# Patient Record
Sex: FEMALE | Race: WHITE | NOT HISPANIC OR LATINO | URBAN - METROPOLITAN AREA
[De-identification: names, ages, dates, MRNs, and addresses within clinical notes are randomized per-mention and may not be internally consistent; named-entity substitution may affect disease eponyms.]

---

## 2017-11-16 ENCOUNTER — OUTPATIENT (OUTPATIENT)
Dept: OUTPATIENT SERVICES | Facility: HOSPITAL | Age: 52
LOS: 1 days | End: 2017-11-16
Payer: COMMERCIAL

## 2017-11-16 VITALS
TEMPERATURE: 98 F | OXYGEN SATURATION: 96 % | RESPIRATION RATE: 18 BRPM | DIASTOLIC BLOOD PRESSURE: 72 MMHG | SYSTOLIC BLOOD PRESSURE: 133 MMHG | HEART RATE: 92 BPM | WEIGHT: 169.09 LBS | HEIGHT: 65.5 IN

## 2017-11-16 DIAGNOSIS — Z90.89 ACQUIRED ABSENCE OF OTHER ORGANS: Chronic | ICD-10-CM

## 2017-11-16 DIAGNOSIS — Z98.82 BREAST IMPLANT STATUS: Chronic | ICD-10-CM

## 2017-11-16 DIAGNOSIS — Z01.810 ENCOUNTER FOR PREPROCEDURAL CARDIOVASCULAR EXAMINATION: ICD-10-CM

## 2017-11-16 LAB
ANION GAP SERPL CALC-SCNC: 15 MMOL/L — SIGNIFICANT CHANGE UP (ref 5–17)
APTT BLD: 29.5 SEC — SIGNIFICANT CHANGE UP (ref 27.5–37.4)
BUN SERPL-MCNC: 12 MG/DL — SIGNIFICANT CHANGE UP (ref 8–20)
CALCIUM SERPL-MCNC: 9.1 MG/DL — SIGNIFICANT CHANGE UP (ref 8.6–10.2)
CHLORIDE SERPL-SCNC: 96 MMOL/L — LOW (ref 98–107)
CHOLEST SERPL-MCNC: 281 MG/DL — HIGH (ref 110–199)
CO2 SERPL-SCNC: 26 MMOL/L — SIGNIFICANT CHANGE UP (ref 22–29)
CREAT SERPL-MCNC: 0.58 MG/DL — SIGNIFICANT CHANGE UP (ref 0.5–1.3)
GLUCOSE SERPL-MCNC: 238 MG/DL — HIGH (ref 70–115)
HCG SERPL-ACNC: <2 MIU/ML — SIGNIFICANT CHANGE UP
HCT VFR BLD CALC: 37.7 % — SIGNIFICANT CHANGE UP (ref 37–47)
HDLC SERPL-MCNC: 37 MG/DL — LOW
HGB BLD-MCNC: 12.2 G/DL — SIGNIFICANT CHANGE UP (ref 12–16)
INR BLD: 1.04 RATIO — SIGNIFICANT CHANGE UP (ref 0.88–1.16)
LIPID PNL WITH DIRECT LDL SERPL: 172 MG/DL — SIGNIFICANT CHANGE UP
MAGNESIUM SERPL-MCNC: 1.8 MG/DL — SIGNIFICANT CHANGE UP (ref 1.8–2.6)
MCHC RBC-ENTMCNC: 27 PG — SIGNIFICANT CHANGE UP (ref 27–31)
MCHC RBC-ENTMCNC: 32.4 G/DL — SIGNIFICANT CHANGE UP (ref 32–36)
MCV RBC AUTO: 83.4 FL — SIGNIFICANT CHANGE UP (ref 81–99)
PLATELET # BLD AUTO: 371 K/UL — SIGNIFICANT CHANGE UP (ref 150–400)
POTASSIUM SERPL-MCNC: 3.9 MMOL/L — SIGNIFICANT CHANGE UP (ref 3.5–5.3)
POTASSIUM SERPL-SCNC: 3.9 MMOL/L — SIGNIFICANT CHANGE UP (ref 3.5–5.3)
PROTHROM AB SERPL-ACNC: 11.5 SEC — SIGNIFICANT CHANGE UP (ref 9.8–12.7)
RBC # BLD: 4.52 M/UL — SIGNIFICANT CHANGE UP (ref 4.4–5.2)
RBC # FLD: 14.3 % — SIGNIFICANT CHANGE UP (ref 11–15.6)
SODIUM SERPL-SCNC: 137 MMOL/L — SIGNIFICANT CHANGE UP (ref 135–145)
TOTAL CHOLESTEROL/HDL RATIO MEASUREMENT: 8 RATIO — HIGH (ref 3.3–7.1)
TRIGL SERPL-MCNC: 359 MG/DL — HIGH (ref 10–200)
WBC # BLD: 10.7 K/UL — SIGNIFICANT CHANGE UP (ref 4.8–10.8)
WBC # FLD AUTO: 10.7 K/UL — SIGNIFICANT CHANGE UP (ref 4.8–10.8)

## 2017-11-16 PROCEDURE — 36415 COLL VENOUS BLD VENIPUNCTURE: CPT

## 2017-11-16 PROCEDURE — 85027 COMPLETE CBC AUTOMATED: CPT

## 2017-11-16 PROCEDURE — 85610 PROTHROMBIN TIME: CPT

## 2017-11-16 PROCEDURE — 83735 ASSAY OF MAGNESIUM: CPT

## 2017-11-16 PROCEDURE — 93010 ELECTROCARDIOGRAM REPORT: CPT

## 2017-11-16 PROCEDURE — G0463: CPT

## 2017-11-16 PROCEDURE — 80061 LIPID PANEL: CPT

## 2017-11-16 PROCEDURE — 85730 THROMBOPLASTIN TIME PARTIAL: CPT

## 2017-11-16 PROCEDURE — 93005 ELECTROCARDIOGRAM TRACING: CPT

## 2017-11-16 PROCEDURE — 84702 CHORIONIC GONADOTROPIN TEST: CPT

## 2017-11-16 PROCEDURE — 80048 BASIC METABOLIC PNL TOTAL CA: CPT

## 2017-11-16 NOTE — H&P PST ADULT - HISTORY OF PRESENT ILLNESS
This is a 53 yo female that has recently new diagnosis of Diabetes with a HGA1C of 11.8 and hyperlipidemia.  She presented to her PMDs office with chest pressure/tightness in chest.  An EKG was preformed and according to her it was abnormal. She was referred to [facility] for further work up.   She has no other Medical problems at this time and has not started on any new medications   on 11/14/2017 she had an NST that revealed hypokinesis as well as abnormal perfusion of the basal inferior and inferior lateral segments  involving the LCX territory .  EF was 61% .

## 2017-11-16 NOTE — ASU PATIENT PROFILE, ADULT - PMH
Abnormal EKG    Chest discomfort    DM (diabetes mellitus)  tried nutritionist and diet change 2016   when  first  hgba1c  was elevated,  repeat is  11  to  f/u with md  Elevated blood sugar    Former smoker    Hemoglobin A1c 8.0% or greater    SOB (shortness of breath)

## 2017-11-16 NOTE — ASU PATIENT PROFILE, ADULT - PSH
Breast implant status  left breast implant  redone due to collapse of  original implant  History of tonsillectomy

## 2017-11-17 ENCOUNTER — OUTPATIENT (OUTPATIENT)
Dept: OUTPATIENT SERVICES | Facility: HOSPITAL | Age: 52
LOS: 1 days | Discharge: HOME | End: 2017-11-17
Payer: COMMERCIAL

## 2017-11-17 ENCOUNTER — TRANSCRIPTION ENCOUNTER (OUTPATIENT)
Age: 52
End: 2017-11-17

## 2017-11-17 VITALS
DIASTOLIC BLOOD PRESSURE: 66 MMHG | OXYGEN SATURATION: 100 % | RESPIRATION RATE: 20 BRPM | HEART RATE: 88 BPM | SYSTOLIC BLOOD PRESSURE: 115 MMHG

## 2017-11-17 DIAGNOSIS — Z01.810 ENCOUNTER FOR PREPROCEDURAL CARDIOVASCULAR EXAMINATION: ICD-10-CM

## 2017-11-17 DIAGNOSIS — Z90.89 ACQUIRED ABSENCE OF OTHER ORGANS: Chronic | ICD-10-CM

## 2017-11-17 DIAGNOSIS — Z98.82 BREAST IMPLANT STATUS: Chronic | ICD-10-CM

## 2017-11-17 DIAGNOSIS — R94.30 ABNORMAL RESULT OF CARDIOVASCULAR FUNCTION STUDY, UNSPECIFIED: ICD-10-CM

## 2017-11-17 LAB
ANION GAP SERPL CALC-SCNC: 15 MMOL/L — SIGNIFICANT CHANGE UP (ref 5–17)
BUN SERPL-MCNC: 10 MG/DL — SIGNIFICANT CHANGE UP (ref 8–20)
CALCIUM SERPL-MCNC: 8.4 MG/DL — LOW (ref 8.6–10.2)
CHLORIDE SERPL-SCNC: 103 MMOL/L — SIGNIFICANT CHANGE UP (ref 98–107)
CO2 SERPL-SCNC: 21 MMOL/L — LOW (ref 22–29)
CREAT SERPL-MCNC: 0.5 MG/DL — SIGNIFICANT CHANGE UP (ref 0.5–1.3)
GLUCOSE BLDC GLUCOMTR-MCNC: 149 MG/DL — HIGH (ref 70–99)
GLUCOSE SERPL-MCNC: 159 MG/DL — HIGH (ref 70–115)
HCT VFR BLD CALC: 34.4 % — LOW (ref 37–47)
HGB BLD-MCNC: 11.2 G/DL — LOW (ref 12–16)
MAGNESIUM SERPL-MCNC: 1.9 MG/DL — SIGNIFICANT CHANGE UP (ref 1.8–2.6)
MCHC RBC-ENTMCNC: 27.2 PG — SIGNIFICANT CHANGE UP (ref 27–31)
MCHC RBC-ENTMCNC: 32.6 G/DL — SIGNIFICANT CHANGE UP (ref 32–36)
MCV RBC AUTO: 83.5 FL — SIGNIFICANT CHANGE UP (ref 81–99)
PHOSPHATE SERPL-MCNC: 3.7 MG/DL — SIGNIFICANT CHANGE UP (ref 2.4–4.7)
PLATELET # BLD AUTO: 324 K/UL — SIGNIFICANT CHANGE UP (ref 150–400)
POTASSIUM SERPL-MCNC: 3.7 MMOL/L — SIGNIFICANT CHANGE UP (ref 3.5–5.3)
POTASSIUM SERPL-SCNC: 3.7 MMOL/L — SIGNIFICANT CHANGE UP (ref 3.5–5.3)
RBC # BLD: 4.12 M/UL — LOW (ref 4.4–5.2)
RBC # FLD: 14.5 % — SIGNIFICANT CHANGE UP (ref 11–15.6)
SODIUM SERPL-SCNC: 139 MMOL/L — SIGNIFICANT CHANGE UP (ref 135–145)
WBC # BLD: 9.8 K/UL — SIGNIFICANT CHANGE UP (ref 4.8–10.8)
WBC # FLD AUTO: 9.8 K/UL — SIGNIFICANT CHANGE UP (ref 4.8–10.8)

## 2017-11-17 PROCEDURE — 93010 ELECTROCARDIOGRAM REPORT: CPT | Mod: 76

## 2017-11-17 PROCEDURE — 93306 TTE W/DOPPLER COMPLETE: CPT | Mod: 26

## 2017-11-17 RX ORDER — DEXTROSE 50 % IN WATER 50 %
25 SYRINGE (ML) INTRAVENOUS ONCE
Qty: 0 | Refills: 0 | Status: DISCONTINUED | OUTPATIENT
Start: 2017-11-17 | End: 2017-12-02

## 2017-11-17 RX ORDER — ATORVASTATIN CALCIUM 80 MG/1
20 TABLET, FILM COATED ORAL AT BEDTIME
Qty: 0 | Refills: 0 | Status: DISCONTINUED | OUTPATIENT
Start: 2017-11-17 | End: 2017-12-02

## 2017-11-17 RX ORDER — ALPRAZOLAM 0.25 MG
0.25 TABLET ORAL EVERY 6 HOURS
Qty: 0 | Refills: 0 | Status: DISCONTINUED | OUTPATIENT
Start: 2017-11-17 | End: 2017-11-17

## 2017-11-17 RX ORDER — SODIUM CHLORIDE 9 MG/ML
1000 INJECTION, SOLUTION INTRAVENOUS
Qty: 0 | Refills: 0 | Status: DISCONTINUED | OUTPATIENT
Start: 2017-11-17 | End: 2017-12-02

## 2017-11-17 RX ORDER — CLOPIDOGREL BISULFATE 75 MG/1
600 TABLET, FILM COATED ORAL ONCE
Qty: 0 | Refills: 0 | Status: COMPLETED | OUTPATIENT
Start: 2017-11-17 | End: 2017-11-17

## 2017-11-17 RX ORDER — DEXTROSE 50 % IN WATER 50 %
12.5 SYRINGE (ML) INTRAVENOUS ONCE
Qty: 0 | Refills: 0 | Status: DISCONTINUED | OUTPATIENT
Start: 2017-11-17 | End: 2017-12-02

## 2017-11-17 RX ORDER — DEXTROSE 50 % IN WATER 50 %
1 SYRINGE (ML) INTRAVENOUS ONCE
Qty: 0 | Refills: 0 | Status: DISCONTINUED | OUTPATIENT
Start: 2017-11-17 | End: 2017-12-02

## 2017-11-17 RX ORDER — GLUCAGON INJECTION, SOLUTION 0.5 MG/.1ML
1 INJECTION, SOLUTION SUBCUTANEOUS ONCE
Qty: 0 | Refills: 0 | Status: DISCONTINUED | OUTPATIENT
Start: 2017-11-17 | End: 2017-12-02

## 2017-11-17 RX ORDER — ACETAMINOPHEN 500 MG
650 TABLET ORAL EVERY 6 HOURS
Qty: 0 | Refills: 0 | Status: DISCONTINUED | OUTPATIENT
Start: 2017-11-17 | End: 2017-12-02

## 2017-11-17 RX ORDER — LISINOPRIL 2.5 MG/1
2.5 TABLET ORAL DAILY
Qty: 0 | Refills: 0 | Status: DISCONTINUED | OUTPATIENT
Start: 2017-11-17 | End: 2017-12-02

## 2017-11-17 RX ORDER — INSULIN LISPRO 100/ML
VIAL (ML) SUBCUTANEOUS AT BEDTIME
Qty: 0 | Refills: 0 | Status: DISCONTINUED | OUTPATIENT
Start: 2017-11-17 | End: 2017-12-02

## 2017-11-17 RX ORDER — ONDANSETRON 8 MG/1
4 TABLET, FILM COATED ORAL ONCE
Qty: 0 | Refills: 0 | Status: COMPLETED | OUTPATIENT
Start: 2017-11-17 | End: 2017-11-17

## 2017-11-17 RX ORDER — ASPIRIN/CALCIUM CARB/MAGNESIUM 324 MG
81 TABLET ORAL DAILY
Qty: 0 | Refills: 0 | Status: DISCONTINUED | OUTPATIENT
Start: 2017-11-18 | End: 2017-12-02

## 2017-11-17 RX ORDER — ASPIRIN/CALCIUM CARB/MAGNESIUM 324 MG
325 TABLET ORAL ONCE
Qty: 0 | Refills: 0 | Status: COMPLETED | OUTPATIENT
Start: 2017-11-17 | End: 2017-11-17

## 2017-11-17 RX ORDER — INSULIN LISPRO 100/ML
VIAL (ML) SUBCUTANEOUS
Qty: 0 | Refills: 0 | Status: DISCONTINUED | OUTPATIENT
Start: 2017-11-17 | End: 2017-12-02

## 2017-11-17 RX ORDER — CLOPIDOGREL BISULFATE 75 MG/1
75 TABLET, FILM COATED ORAL DAILY
Qty: 0 | Refills: 0 | Status: DISCONTINUED | OUTPATIENT
Start: 2017-11-18 | End: 2017-12-02

## 2017-11-17 RX ORDER — ZOLPIDEM TARTRATE 10 MG/1
5 TABLET ORAL AT BEDTIME
Qty: 0 | Refills: 0 | Status: DISCONTINUED | OUTPATIENT
Start: 2017-11-17 | End: 2017-11-17

## 2017-11-17 RX ADMIN — ONDANSETRON 4 MILLIGRAM(S): 8 TABLET, FILM COATED ORAL at 21:15

## 2017-11-17 RX ADMIN — Medication 325 MILLIGRAM(S): at 17:15

## 2017-11-17 RX ADMIN — CLOPIDOGREL BISULFATE 600 MILLIGRAM(S): 75 TABLET, FILM COATED ORAL at 23:54

## 2017-11-17 NOTE — DISCHARGE NOTE ADULT - PLAN OF CARE
optimize [facility] Restricted use with no heavy lifting of affected arm for 48 hours.  No submerging the arm in water for 48 hours.  You may start showering today.  Call your doctor for any bleeding, swelling, loss of sensation in the hand or fingers, or fingers turning blue.  If heavy bleeding or large lumps form, hold pressure at the spot and come to the Emergency Charleen lawton Follow up in 2 weeks with Dr. Latif  Continue with all prescribed medications.  Don't stop your antiplatelet medication (Plavix/Effient/Brilinta) without asking your cardiologist first.  Follow your prescribed diet.

## 2017-11-17 NOTE — PROGRESS NOTE ADULT - SUBJECTIVE AND OBJECTIVE BOX
Post Procedure EKG: Sinus Rhythm non-specific St-T wave abnormalities, no significant change from baseline.

## 2017-11-17 NOTE — DISCHARGE NOTE ADULT - HOSPITAL COURSE
abnormal NST post PCI to the LCX This is a 53 yo female that has recently new diagnosis of Diabetes with a HGA1C of 11.8 and hyperlipidemia.  She presented to her PMDs office with chest pressure/tightness in chest.  An EKG was preformed and according to her it was abnormal. She was referred to [facility] for further work up.   She has no other Medical problems at this time and has not started on any new medications.  On 11/14/2017 she had an NST that revealed hypokinesis as well as abnormal perfusion of the basal inferior and inferior lateral segments  involving the LCX territory .  EF was 61% .  She had a LHC which showed:   VENTRICLES: LVEF 55%  CORONARY VESSELS: The coronary circulation is right dominant.  LM:     --  LM: Normal.  LAD:     --  Proximal LAD: There was a 20 % stenosis.  --  D1: There was a 25 % stenosis.  --  D2: There was a 90 % stenosis.  CX:     --  Mid circumflex: There was a 95 % stenosis treated with a SYNERGY 2.25MM X 16MM drug-eluting stent and a SYNERGY 2.75MM X 20MM drug-eluting stent .  --  OM1: There was a 95 % stenosis.  RCA:     --  RCA: Angiography showed minor luminal irregularities with no flow limiting lesions.  --  RPDA: There was a 25 % stenosis.    Pt noted to be hypotensive by RN Davey, BP repeated and remained approximately 80/50, and HR dropped to the 40's she started to vomit and became pale.  She denied chest pain.  Procedure site CDI, no bleeding, no hematoma, able to move all digits with capillary refill < 3 seconds, fingers warm.  She was medicated with Zofran 4 mg IVP and Atropine1 mg IVP put into Trendelenburg and given NS bolus with brisk return of HR, BP and color.  Rapid response called. Stat EKG was unchanged from baseline EKG, stat ECHO ordered and in progress.  The echo showed:   Left Ventricle: The left ventricular internal cavity size is normal. Global LV systolic function was normal. Left ventricular ejection fraction, by visual estimation, is 60 to 65%. Spectral Doppler shows normal pattern of LV diastolic filling.  Right Ventricle: Normal right ventricular size and function. TV S' 0.1 m/s.  Left Atrium: The left atrium is normal in size.  Right Atrium: The right atrium is normal in size.  Pericardium: There is no evidence of pericardial effusion.  Mitral Valve: Structurally normal mitral valve, with normal leaflet excursion. Trace mitral valve regurgitation is seen.  Tricuspid Valve: Trivial tricuspid regurgitation is visualized. Adequate TR velocity was not obtained to accurately assess RVSP.  Aortic Valve: The aortic valve was not well visualized. No evidence of aortic valve regurgitation is seen.  Pulmonic Valve: The pulmonic valve was not well visualized. No indication of pulmonic valve regurgitation.  Aorta: The aortic root is normal in size and structure. The ascending aorta was not well visualized.  Pulmonary Artery: The pulmonary artery is not well seen.  Venous: The pulmonary veins appear normal. The inferior vena cava is normal. The inferior vena cava was normal sized, with respiratory size variation greater than 50%.      Plan:   1. Discharge home today  2. Follow up as an outpatient with: [facility]  3. Post procedure teaching done including importance of medication adherence and access site care and monitoring.  4. DAPT: Plavix 75mg daily with aspirin 81mg daily  5. Statin: Lipitor 20mg daily  6. Beta Blocker: N/A  7. ACEI/ARB: Lisinopril 2.5mg daily

## 2017-11-17 NOTE — DISCHARGE NOTE ADULT - CARE PROVIDER_API CALL
Rajat Minaya), Cardiovascular Disease; Internal Medicine; Interventional Cardiology  [address] [place] [address]  Phone: [phone]  Fax: [phone] Edvin Latif (MBBS; MPH), Internal Medicine  [address] [place] [address]  Phone: [phone]  Fax: [phone]

## 2017-11-17 NOTE — DISCHARGE NOTE ADULT - PRINCIPAL DIAGNOSIS
Stented coronary artery Coronary artery disease of native artery of native heart with stable angina pectoris

## 2017-11-17 NOTE — DISCHARGE NOTE ADULT - MEDICATION SUMMARY - MEDICATIONS TO TAKE
I will START or STAY ON the medications listed below when I get home from the hospital:    aspirin 81 mg oral tablet, chewable  -- 1 tab(s) by mouth once a day  -- Indication: For Coronary artery disease of native artery of native heart with stable angina pectoris    lisinopril 2.5 mg oral tablet  -- 1 tab(s) by mouth once a day  -- Indication: For Hypertension    atorvastatin 20 mg oral tablet  -- 1 tab(s) by mouth once a day (at bedtime)  -- Indication: For Hyperlipidemia    clopidogrel 75 mg oral tablet  -- 1 tab(s) by mouth once a day  -- Indication: For Coronary artery disease of native artery of native heart with stable angina pectoris    Vitamin B12 1000 mcg/mL injectable solution  -- 1 dose(s) injectable once a week  -- Indication: For Supplement I will START or STAY ON the medications listed below when I get home from the hospital:    aspirin 81 mg oral tablet, chewable  -- 1 tab(s) by mouth once a day  -- Indication: For Coronary artery disease of native artery of native heart with stable angina pectoris    lisinopril 2.5 mg oral tablet  -- 1 tab(s) by mouth once a day  -- Indication: For Hypertension    atorvastatin 20 mg oral tablet  -- 1 tab(s) by mouth once a day (at bedtime)  -- Indication: For Hyperlipidemia    clopidogrel 75 mg oral tablet  -- 1 tab(s) by mouth once a day  -- Indication: For Coronary artery disease of native artery of native heart with stable angina pectoris    metoprolol succinate 25 mg oral tablet, extended release  -- 1 tab(s) by mouth once a day   -- It is very important that you take or use this exactly as directed.  Do not skip doses or discontinue unless directed by your doctor.  May cause drowsiness.  Alcohol may intensify this effect.  Use care when operating dangerous machinery.  Some non-prescription drugs may aggravate your condition.  Read all labels carefully.  If a warning appears, check with your doctor before taking.  Swallow whole.  Do not crush.  Take with food or milk.  This drug may impair the ability to drive or operate machinery.  Use care until you become familiar with its effects.    -- Indication: For Hypertension    Vitamin B12 1000 mcg/mL injectable solution  -- 1 dose(s) injectable once a week  -- Indication: For Supplement

## 2017-11-17 NOTE — PROGRESS NOTE ADULT - ASSESSMENT
Plan   1. bedrest for   1   hours  2. continue  Statin, Ace   3. Antiplatelet therapy  Plavix aspirin  to continue for one year minimum   4. follow up with cardiology 2 weeks post procedure   5. post produral care and instructions reviewed with the patient as well as questions answered.    6. plan for discharge home when stable

## 2017-11-17 NOTE — DISCHARGE NOTE ADULT - CARE PLAN
Principal Discharge DX:	Stented coronary artery  Goal:	optimize [facility] health  Instructions for follow-up, activity and diet:	Restricted use with no heavy lifting of affected arm for 48 hours.  No submerging the arm in water for 48 hours.  You may start showering today.  Call your doctor for any bleeding, swelling, loss of sensation in the hand or fingers, or fingers turning blue.  If heavy bleeding or large lumps form, hold pressure at the spot and come to the Emergency Charleen  mShai Principal Discharge DX:	Coronary artery disease of native artery of native heart with stable angina pectoris  Goal:	optimize [facility] health  Instructions for follow-up, activity and diet:	Follow up in 2 weeks with Dr. Latif  Continue with all prescribed medications.  Don't stop your antiplatelet medication (Plavix/Effient/Brilinta) without asking your cardiologist first.  Follow your prescribed diet.

## 2017-11-17 NOTE — DISCHARGE NOTE ADULT - PATIENT PORTAL LINK FT
“You can access the Follow[employer] Patient Portal, offered by [facility], by registering with the following website: [email or web address]”

## 2017-11-17 NOTE — PROGRESS NOTE ADULT - SUBJECTIVE AND OBJECTIVE BOX
Nurse Practitioner Progress note:   s/p [facility] with successful PCI and YUE to LCX  Pt noted to be hypotensive by RN Davey, BP repeated and remained approx 80/50, and HR dropped to the 40's she started to vomit and became pale. No c/o CP.   Right radial hemoband in place.  Procedure site CDI, no bleeding, no hematoma, able to move all digits with capillary refill < 3 seconds, fingers warm.  Pt Medicated with Zofran 4 mg IVP and Atropine1 mg IVP put into Trendelenburg and given NS bolus with brisk return of HR, BP and color.  Rapid response called. Stat EKG was unchanged from baseline EKG, stat ECHO ordered and in progress.      T(C): --  HR: 87 (11-17-17 @ 20:23) (73 - 89)  BP: 127/73 (11-17-17 @ 20:23) (96/53 - 127/73)  RR: 16 (11-17-17 @ 20:23) (16 - 23)  SpO2: 96% (11-17-17 @ 20:23) (95% - 100%)    MEDICATIONS  (STANDING):  atorvastatin 20 milliGRAM(s) Oral at bedtime  clopidogrel Tablet 75 milliGRAM(s) Oral daily  clopidogrel Tablet 600 milliGRAM(s) Oral once  lisinopril 2.5 milliGRAM(s) Oral daily  ondansetron Injectable 4 milliGRAM(s) IV Push once    MEDICATIONS  (PRN):  ALPRAZolam 0.25 milliGRAM(s) Oral every 6 hours PRN anxiety  zolpidem 5 milliGRAM(s) Oral at bedtime PRN Insomnia        HPI:    now s/p [facility] with succesful PCI and YUE to LCX    ASSESSMENT/PLAN:    Coronary artery disease  -Hypotension and Bradycardia  -VS, labs, diet, activity as per PCI orders  -IV hydration  -likely d/c in AM if patient remains stable overnight  -NP to see in AM to evaluate labs, EKG and procedure site check in am.

## 2017-11-17 NOTE — PROGRESS NOTE ADULT - SUBJECTIVE AND OBJECTIVE BOX
I have seen and examined this patient. There is no change since the last H& P. Consent obtained. Agree with cardiac cath. Risks and benefits fully explained. All questions answered.    Rajat Minaya MD

## 2017-11-17 NOTE — PROGRESS NOTE ADULT - SUBJECTIVE AND OBJECTIVE BOX
Patient is a 52y old  Female who presents with a chief complaint of post pci LCX stent.        PAST MEDICAL & SURGICAL HISTORY:  SOB (shortness of breath)  Chest discomfort  DM (diabetes mellitus): tried nutritionist and diet change 2016   when  first  hgba1c  was elevated,  repeat is  11  to  f/u with md  Former smoker  Hemoglobin A1c 8.0% or greater  Abnormal EKG  Elevated blood sugar  Breast implant status: left breast implant  redone due to collapse of  original implant  History of tonsillectomy            Allergies    erythromycin (Unknown)    Intolerances        MEDICATIONS  (STANDING):  atorvastatin 20 milliGRAM(s) Oral at bedtime  clopidogrel Tablet 75 milliGRAM(s) Oral daily  clopidogrel Tablet 600 milliGRAM(s) Oral once  lisinopril 2.5 milliGRAM(s) Oral daily    MEDICATIONS  (PRN):    	    Vital Signs Last 24 Hrs  T(C): --  T(F): --  HR: 89 (17 Nov 2017 18:54) (73 - 89)  BP: 113/78 (17 Nov 2017 18:54) (96/53 - 115/66)  BP(mean): --  RR: 16 (17 Nov 2017 18:54) (16 - 23)  SpO2: 95% (17 Nov 2017 18:54) (95% - 100%)    I&O's Detail      PHYSICAL EXAM:  Appearance: Normal, well nourished	  HEENT:   Normal oral mucosa, PERRL, EOMI, sclera non-icteric	  Lymphatic: No cervical lymphadenopathy  Cardiovascular: Normal S1 S2, No JVD, No cardiac murmurs, No carotid bruits, No peripheral edema  Respiratory: Lungs clear to auscultation	  Psychiatry: A & O x 3, Mood & affect appropriate  Gastrointestinal:  Soft, Non-tender, + BS, no bruits	  Skin: No rashes, No ecchymoses, No cyanosis  Neurologic: Grossly non-focal with full strength in all four extremities  Extremities: Normal range of motion, No clubbing, cyanosis or edema  Vascular: Peripheral pulses palpable 2+ bilaterally      INTERPRETATION OF TELEMETRY: NSR     ECG: pending     LABS:                         12.2   10.7  )-----------( 371      ( 16 Nov 2017 15:01 )             37.7     11-16    137  |  96<L>  |  12.0  ----------------------------<  238<H>  3.9   |  26.0  |  0.58    Ca    9.1      16 Nov 2017 15:01  Mg     1.8     11-16          PT/INR - ( 16 Nov 2017 15:01 )   PT: 11.5 sec;   INR: 1.04 ratio         PTT - ( 16 Nov 2017 15:01 )  PTT:29.5 sec    I&O's Summary    BNP

## 2017-11-17 NOTE — CHART NOTE - NSCHARTNOTEFT_GEN_A_CORE
Rapid Response PGY 3 Note    [id number]  TESSIE GIORGI    Rapid Repsonse was called on a 52y year old Female patient for hypotension  Patient has a past medical history of DM, former smoker    Patient was seen and examined at the bedside by the rapid response team.    Allergies    erythromycin (Unknown)    Intolerances        PAST MEDICAL & SURGICAL HISTORY:  SOB (shortness of breath)  Chest discomfort  DM (diabetes mellitus): tried nutritionist and diet change 2016   when  first  hgba1c  was elevated,  repeat is  11  to  f/u with md  Former smoker  Hemoglobin A1c 8.0% or greater  Abnormal EKG  Elevated blood sugar  Breast implant status: left breast implant  redone due to collapse of  original implant  History of tonsillectomy      Vital Signs Last 24 Hrs  T(F):   HR: 87 (17 Nov 2017 20:23) (73 - 89)  BP: 127/73   RR: 16 (17 Nov 2017 20:23) (16 - 23)  SpO2: 96% (17 Nov 2017 20:23) (95% - 100%)          PHYSICAL EXAM:    GENERAL: NAD, well-groomed, well-developed  HEAD:  Atraumatic, Normocephalic  EYES: EOMI, PERRLA, conjunctiva and sclera clear  NECK: Supple, No JVD,   NERVOUS SYSTEM:  Alert & Oriented X3, Good concentration;   CHEST/LUNG: CTA bilaterally; No rales, rhonchi, wheezing, or rubs  HEART: Regular rate and rhythm; No murmurs, rubs, or gallops  ABDOMEN: Soft, Nontender, Nondistended; Bowel sounds present  EXTREMITIES:  2+ Peripheral Pulses, No clubbing, cyanosis, or edema                                12.2   10.7  )-----------( 371      ( 16 Nov 2017 15:01 )             37.7     11-16    137  |  96<L>  |  12.0  ----------------------------<  238<H>  3.9   |  26.0  |  0.58    Ca    9.1      16 Nov 2017 15:01  Mg     1.8     11-16                PT/INR - ( 16 Nov 2017 15:01 )   PT: 11.5 sec;   INR: 1.04 ratio         PTT - ( 16 Nov 2017 15:01 )  PTT:29.5 sec      Assessment- Rapid Response called for 52y year old Female with a past medical history of DM, CAD s/p stent to LCx.  1.) Vasovagal  Plan-  Patient received 500cc bolus of NS  Amp of Atropine  EKG -stat; no changes  Zofran for nausea  Stat bedside Echo  Dr. Minaya was called is aware Rapid Response PGY 3 Note    [id number]  TESSIE RAND    Rapid Repsonse was called on a 52y year old Female patient for hypotension  Patient has a past medical history of DM, former smoker    Patient was seen and examined at the bedside by the rapid response team.  By the time the RRT arrived, patient had already received 1amp of atropine, zofran, 500cc NS bolus and stat EKG and Patient stated she was feeling a lot better.  Nausea had subsided, denied chest pain, sob, dizziness, blurry vision.    Allergies    erythromycin (Unknown)    Intolerances        PAST MEDICAL & SURGICAL HISTORY:  SOB (shortness of breath)  Chest discomfort  DM (diabetes mellitus): tried nutritionist and diet change 2016   when  first  hgba1c  was elevated,  repeat is  11  to  f/u with md  Former smoker  Hemoglobin A1c 8.0% or greater  Abnormal EKG  Elevated blood sugar  Breast implant status: left breast implant  redone due to collapse of  original implant  History of tonsillectomy      Vital Signs Last 24 Hrs  T(F):   HR: 87 (17 Nov 2017 20:23) (73 - 89)  BP: 127/73   RR: 16 (17 Nov 2017 20:23) (16 - 23)  SpO2: 96% (17 Nov 2017 20:23) (95% - 100%)          PHYSICAL EXAM:    GENERAL: NAD, well-groomed, well-developed  HEAD:  Atraumatic, Normocephalic  EYES: EOMI, PERRLA, conjunctiva and sclera clear  NECK: Supple, No JVD,   NERVOUS SYSTEM:  Alert & Oriented X3, Good concentration;   CHEST/LUNG: CTA bilaterally; No rales, rhonchi, wheezing, or rubs  HEART: Regular rate and rhythm; No murmurs, rubs, or gallops  ABDOMEN: Soft, Nontender, Nondistended; Bowel sounds present  EXTREMITIES:  2+ Peripheral Pulses, No clubbing, cyanosis, or edema                                12.2   10.7  )-----------( 371      ( 16 Nov 2017 15:01 )             37.7     11-16    137  |  96<L>  |  12.0  ----------------------------<  238<H>  3.9   |  26.0  |  0.58    Ca    9.1      16 Nov 2017 15:01  Mg     1.8     11-16                PT/INR - ( 16 Nov 2017 15:01 )   PT: 11.5 sec;   INR: 1.04 ratio         PTT - ( 16 Nov 2017 15:01 )  PTT:29.5 sec      Assessment- Rapid Response called for 52y year old Female with a past medical history of DM, CAD s/p stent to LCx.  1.) Vasovagal  Plan-  Patient received 500cc bolus of NS  Amp of Atropine  EKG -stat; no changes  Zofran for nausea  Stat bedside Echo  Dr. Minaya was called is aware. Rapid Response PGY 3 Note    [id number]  TESSIE RAND    Rapid Repsonse was called on a 52y year old Female patient for hypotension  Patient has a past medical history of DM, former smoker    Patient was seen and examined at the bedside by the rapid response team.  By the time the RRT arrived, patient had already received 1amp of atropine, zofran, 500cc NS bolus and stat EKG and Patient stated she was feeling a lot better.  Nausea had subsided, denied chest pain, sob, dizziness, blurry vision.    Allergies    erythromycin (Unknown)    Intolerances        PAST MEDICAL & SURGICAL HISTORY:  SOB (shortness of breath)  Chest discomfort  DM (diabetes mellitus): tried nutritionist and diet change 2016   when  first  hgba1c  was elevated,  repeat is  11  to  f/u with md  Former smoker  Hemoglobin A1c 8.0% or greater  Abnormal EKG  Elevated blood sugar  Breast implant status: left breast implant  redone due to collapse of  original implant  History of tonsillectomy      Vital Signs Last 24 Hrs  T(F):   HR: 87 (17 Nov 2017 20:23) (73 - 89)  BP: 127/73   RR: 16 (17 Nov 2017 20:23) (16 - 23)  SpO2: 96% (17 Nov 2017 20:23) (95% - 100%)          PHYSICAL EXAM:    GENERAL: NAD, well-groomed, well-developed  HEAD:  Atraumatic, Normocephalic  EYES: EOMI, PERRLA, conjunctiva and sclera clear  NECK: Supple, No JVD,   NERVOUS SYSTEM:  Alert & Oriented X3, Good concentration;   CHEST/LUNG: CTA bilaterally; No rales, rhonchi, wheezing, or rubs  HEART: Regular rate and rhythm; No murmurs, rubs, or gallops  ABDOMEN: Soft, Nontender, Nondistended; Bowel sounds present  EXTREMITIES:  2+ Peripheral Pulses, No clubbing, cyanosis, or edema                                12.2   10.7  )-----------( 371      ( 16 Nov 2017 15:01 )             37.7     11-16    137  |  96<L>  |  12.0  ----------------------------<  238<H>  3.9   |  26.0  |  0.58    Ca    9.1      16 Nov 2017 15:01  Mg     1.8     11-16                PT/INR - ( 16 Nov 2017 15:01 )   PT: 11.5 sec;   INR: 1.04 ratio         PTT - ( 16 Nov 2017 15:01 )  PTT:29.5 sec      Assessment- Rapid Response called for 52y year old Female with a past medical history of DM, CAD s/p stent to LCx.  1.) Vasovagal  Plan-  Patient received 500cc bolus of NS  Amp of Atropine  EKG -stat; no changes  Zofran for nausea  Stat bedside Echo  Dr. Minaya was called is aware.  Will also order some labs on patient: CBC, BMP, Magnesium, Phosphorous.  Stat Accucheck. Rapid Response PGY 3 Note    [id number]  TESSIE RAND    Rapid Repsonse was called on a 52y year old Female patient for hypotension  Patient has a past medical history of DM, former smoker    Patient was seen and examined at the bedside by the rapid response team.  By the time the RRT arrived, patient had already received 1amp of atropine, zofran, 500cc NS bolus and stat EKG and Patient stated she was feeling a lot better.  Nausea had subsided, denied chest pain, sob, dizziness, blurry vision.    Allergies    erythromycin (Unknown)    Intolerances        PAST MEDICAL & SURGICAL HISTORY:  SOB (shortness of breath)  Chest discomfort  DM (diabetes mellitus): tried nutritionist and diet change 2016   when  first  hgba1c  was elevated,  repeat is  11  to  f/u with md  Former smoker  Hemoglobin A1c 8.0% or greater  Abnormal EKG  Elevated blood sugar  Breast implant status: left breast implant  redone due to collapse of  original implant  History of tonsillectomy      Vital Signs Last 24 Hrs  T(F):   HR: 87 (17 Nov 2017 20:23) (73 - 89)  BP: 127/73   RR: 16 (17 Nov 2017 20:23) (16 - 23)  SpO2: 96% (17 Nov 2017 20:23) (95% - 100%)          PHYSICAL EXAM:    GENERAL: NAD, well-groomed, well-developed  HEAD:  Atraumatic, Normocephalic  EYES: EOMI, PERRLA, conjunctiva and sclera clear  NECK: Supple, No JVD,   NERVOUS SYSTEM:  Alert & Oriented X3, Good concentration;   CHEST/LUNG: CTA bilaterally; No rales, rhonchi, wheezing, or rubs  HEART: Regular rate and rhythm; No murmurs, rubs, or gallops  ABDOMEN: Soft, Nontender, Nondistended; Bowel sounds present  EXTREMITIES:  2+ Peripheral Pulses, No clubbing, cyanosis, or edema                                12.2   10.7  )-----------( 371      ( 16 Nov 2017 15:01 )             37.7     11-16    137  |  96<L>  |  12.0  ----------------------------<  238<H>  3.9   |  26.0  |  0.58    Ca    9.1      16 Nov 2017 15:01  Mg     1.8     11-16                PT/INR - ( 16 Nov 2017 15:01 )   PT: 11.5 sec;   INR: 1.04 ratio         PTT - ( 16 Nov 2017 15:01 )  PTT:29.5 sec      Assessment- Rapid Response called for 52y year old Female with a past medical history of DM, CAD s/p stent to LCx.  1.) Vasovagal  Plan-  Patient received 500cc bolus of NS  Amp of Atropine  EKG -stat; no changes  Zofran for nausea  Stat bedside Echo  Dr. Minaya was called is aware.  Will also order some labs on patient: CBC, BMP, Magnesium, Phosphorous.  Stat Accucheck.  Will start patient on Accuchecks, HSS-medium.

## 2017-11-17 NOTE — DISCHARGE NOTE ADULT - INSTRUCTIONS
Choose lean meats and poultry without skin and prepare them without added saturated and trans fat.  Eat fish at least twice a week. Recent research shows that eating oily fish containing omega-3 fatty acids (for example, salmon, trout and herring) may help lower your risk of death from coronary artery disease.  Select fat-free, 1 percent fat and low-fat dairy products.  Cut back on foods containing partially hydrogenated vegetable oils to reduce trans fat in your diet.   To lower cholesterol, reduce saturated fat to no more than 5 to 6 percent of total calories. For someone eating 2,000 calories a day, that’s about 13 grams of saturated fat.  Cut back on beverages and foods with added sugars.  Choose and prepare foods with little or no salt. To lower blood pressure, aim to eat no more than 2,400 milligrams of sodium per day. Reducing daily intake to 1,500 mg is desirable because it can lower blood pressure even further.  If you drink alcohol, drink in moderation. That means one drink per day if you’re a woman and two drinks  per day if you’re a man.  Follow the American Heart Association recommendations when you eat out, and keep an eye on your portion sizes. With a diagnosis of diabetes comes a strict diet regimen to help control blood sugars by watching carbohydrate consumption. Carbohydrates, such as starches, fruits, dairy and starchy vegetables, is the food group that affects glucose levels in the body. Carefully monitoring carbohydrate intake is a main component of the diabetic diet; eating three meals each day that are roughly equivalent in size can help control blood sugars. A registered dietitian can help you plan a diet customized to your individual needs. monitor right groin site for signs of infection

## 2017-11-18 ENCOUNTER — INPATIENT (INPATIENT)
Facility: HOSPITAL | Age: 52
LOS: 4 days | Discharge: HOME | DRG: 395 | End: 2017-11-23
Attending: INTERNAL MEDICINE | Admitting: FAMILY MEDICINE
Payer: COMMERCIAL

## 2017-11-18 VITALS
RESPIRATION RATE: 16 BRPM | OXYGEN SATURATION: 98 % | HEIGHT: 65 IN | TEMPERATURE: 98 F | DIASTOLIC BLOOD PRESSURE: 84 MMHG | WEIGHT: 167.99 LBS | SYSTOLIC BLOOD PRESSURE: 142 MMHG | HEART RATE: 92 BPM

## 2017-11-18 VITALS
HEART RATE: 81 BPM | SYSTOLIC BLOOD PRESSURE: 121 MMHG | OXYGEN SATURATION: 98 % | DIASTOLIC BLOOD PRESSURE: 80 MMHG | RESPIRATION RATE: 16 BRPM

## 2017-11-18 DIAGNOSIS — I25.118 ATHEROSCLEROTIC HEART DISEASE OF NATIVE CORONARY ARTERY WITH OTHER FORMS OF ANGINA PECTORIS: ICD-10-CM

## 2017-11-18 DIAGNOSIS — K52.9 NONINFECTIVE GASTROENTERITIS AND COLITIS, UNSPECIFIED: ICD-10-CM

## 2017-11-18 DIAGNOSIS — E78.5 HYPERLIPIDEMIA, UNSPECIFIED: ICD-10-CM

## 2017-11-18 DIAGNOSIS — Z98.82 BREAST IMPLANT STATUS: Chronic | ICD-10-CM

## 2017-11-18 DIAGNOSIS — Z95.5 PRESENCE OF CORONARY ANGIOPLASTY IMPLANT AND GRAFT: Chronic | ICD-10-CM

## 2017-11-18 DIAGNOSIS — E11.9 TYPE 2 DIABETES MELLITUS WITHOUT COMPLICATIONS: ICD-10-CM

## 2017-11-18 DIAGNOSIS — Z90.89 ACQUIRED ABSENCE OF OTHER ORGANS: Chronic | ICD-10-CM

## 2017-11-18 LAB
ALBUMIN SERPL ELPH-MCNC: 3.9 G/DL — SIGNIFICANT CHANGE UP (ref 3.3–5.2)
ALBUMIN SERPL ELPH-MCNC: 4 G/DL — SIGNIFICANT CHANGE UP (ref 3.3–5.2)
ALP SERPL-CCNC: 68 U/L — SIGNIFICANT CHANGE UP (ref 40–120)
ALP SERPL-CCNC: 75 U/L — SIGNIFICANT CHANGE UP (ref 40–120)
ALT FLD-CCNC: 22 U/L — SIGNIFICANT CHANGE UP
ALT FLD-CCNC: 23 U/L — SIGNIFICANT CHANGE UP
ANION GAP SERPL CALC-SCNC: 15 MMOL/L — SIGNIFICANT CHANGE UP (ref 5–17)
ANION GAP SERPL CALC-SCNC: 17 MMOL/L — SIGNIFICANT CHANGE UP (ref 5–17)
APPEARANCE UR: CLEAR — SIGNIFICANT CHANGE UP
APTT BLD: 27.9 SEC — SIGNIFICANT CHANGE UP (ref 27.5–37.4)
AST SERPL-CCNC: 16 U/L — SIGNIFICANT CHANGE UP
AST SERPL-CCNC: 18 U/L — SIGNIFICANT CHANGE UP
BASOPHILS # BLD AUTO: 0 K/UL — SIGNIFICANT CHANGE UP (ref 0–0.2)
BASOPHILS # BLD AUTO: 0 K/UL — SIGNIFICANT CHANGE UP (ref 0–0.2)
BASOPHILS NFR BLD AUTO: 0.3 % — SIGNIFICANT CHANGE UP (ref 0–2)
BASOPHILS NFR BLD AUTO: 0.4 % — SIGNIFICANT CHANGE UP (ref 0–2)
BILIRUB SERPL-MCNC: 0.6 MG/DL — SIGNIFICANT CHANGE UP (ref 0.4–2)
BILIRUB SERPL-MCNC: 0.6 MG/DL — SIGNIFICANT CHANGE UP (ref 0.4–2)
BILIRUB UR-MCNC: NEGATIVE — SIGNIFICANT CHANGE UP
BLD GP AB SCN SERPL QL: SIGNIFICANT CHANGE UP
BUN SERPL-MCNC: 12 MG/DL — SIGNIFICANT CHANGE UP (ref 8–20)
BUN SERPL-MCNC: 6 MG/DL — LOW (ref 8–20)
CALCIUM SERPL-MCNC: 9.1 MG/DL — SIGNIFICANT CHANGE UP (ref 8.6–10.2)
CALCIUM SERPL-MCNC: 9.1 MG/DL — SIGNIFICANT CHANGE UP (ref 8.6–10.2)
CHLORIDE SERPL-SCNC: 102 MMOL/L — SIGNIFICANT CHANGE UP (ref 98–107)
CHLORIDE SERPL-SCNC: 99 MMOL/L — SIGNIFICANT CHANGE UP (ref 98–107)
CO2 SERPL-SCNC: 20 MMOL/L — LOW (ref 22–29)
CO2 SERPL-SCNC: 23 MMOL/L — SIGNIFICANT CHANGE UP (ref 22–29)
COLOR SPEC: YELLOW — SIGNIFICANT CHANGE UP
CREAT SERPL-MCNC: 0.54 MG/DL — SIGNIFICANT CHANGE UP (ref 0.5–1.3)
CREAT SERPL-MCNC: 0.54 MG/DL — SIGNIFICANT CHANGE UP (ref 0.5–1.3)
DIFF PNL FLD: NEGATIVE — SIGNIFICANT CHANGE UP
EOSINOPHIL # BLD AUTO: 0 K/UL — SIGNIFICANT CHANGE UP (ref 0–0.5)
EOSINOPHIL # BLD AUTO: 0.2 K/UL — SIGNIFICANT CHANGE UP (ref 0–0.5)
EOSINOPHIL NFR BLD AUTO: 0.2 % — SIGNIFICANT CHANGE UP (ref 0–6)
EOSINOPHIL NFR BLD AUTO: 2.5 % — SIGNIFICANT CHANGE UP (ref 0–6)
EPI CELLS # UR: SIGNIFICANT CHANGE UP
GLUCOSE BLDC GLUCOMTR-MCNC: 133 MG/DL — HIGH (ref 70–99)
GLUCOSE SERPL-MCNC: 177 MG/DL — HIGH (ref 70–115)
GLUCOSE SERPL-MCNC: 227 MG/DL — HIGH (ref 70–115)
GLUCOSE UR QL: 100 MG/DL
HCT VFR BLD CALC: 34.7 % — LOW (ref 37–47)
HCT VFR BLD CALC: 38.7 % — SIGNIFICANT CHANGE UP (ref 37–47)
HGB BLD-MCNC: 11.2 G/DL — LOW (ref 12–16)
HGB BLD-MCNC: 12.7 G/DL — SIGNIFICANT CHANGE UP (ref 12–16)
INR BLD: 1.08 RATIO — SIGNIFICANT CHANGE UP (ref 0.88–1.16)
KETONES UR-MCNC: ABNORMAL
LEUKOCYTE ESTERASE UR-ACNC: NEGATIVE — SIGNIFICANT CHANGE UP
LIDOCAIN IGE QN: 23 U/L — SIGNIFICANT CHANGE UP (ref 22–51)
LYMPHOCYTES # BLD AUTO: 1.3 K/UL — SIGNIFICANT CHANGE UP (ref 1–4.8)
LYMPHOCYTES # BLD AUTO: 2.5 K/UL — SIGNIFICANT CHANGE UP (ref 1–4.8)
LYMPHOCYTES # BLD AUTO: 27 % — SIGNIFICANT CHANGE UP (ref 20–55)
LYMPHOCYTES # BLD AUTO: 8.9 % — LOW (ref 20–55)
MCHC RBC-ENTMCNC: 27 PG — SIGNIFICANT CHANGE UP (ref 27–31)
MCHC RBC-ENTMCNC: 27.3 PG — SIGNIFICANT CHANGE UP (ref 27–31)
MCHC RBC-ENTMCNC: 32.3 G/DL — SIGNIFICANT CHANGE UP (ref 32–36)
MCHC RBC-ENTMCNC: 32.8 G/DL — SIGNIFICANT CHANGE UP (ref 32–36)
MCV RBC AUTO: 83.2 FL — SIGNIFICANT CHANGE UP (ref 81–99)
MCV RBC AUTO: 83.6 FL — SIGNIFICANT CHANGE UP (ref 81–99)
MONOCYTES # BLD AUTO: 0.8 K/UL — SIGNIFICANT CHANGE UP (ref 0–0.8)
MONOCYTES # BLD AUTO: 1.1 K/UL — HIGH (ref 0–0.8)
MONOCYTES NFR BLD AUTO: 7.4 % — SIGNIFICANT CHANGE UP (ref 3–10)
MONOCYTES NFR BLD AUTO: 8.6 % — SIGNIFICANT CHANGE UP (ref 3–10)
NEUTROPHILS # BLD AUTO: 11.8 K/UL — HIGH (ref 1.8–8)
NEUTROPHILS # BLD AUTO: 5.6 K/UL — SIGNIFICANT CHANGE UP (ref 1.8–8)
NEUTROPHILS NFR BLD AUTO: 61.1 % — SIGNIFICANT CHANGE UP (ref 37–73)
NEUTROPHILS NFR BLD AUTO: 82.7 % — HIGH (ref 37–73)
NITRITE UR-MCNC: NEGATIVE — SIGNIFICANT CHANGE UP
OB PNL STL: POSITIVE
PH UR: 5 — SIGNIFICANT CHANGE UP (ref 5–8)
PLATELET # BLD AUTO: 291 K/UL — SIGNIFICANT CHANGE UP (ref 150–400)
PLATELET # BLD AUTO: 340 K/UL — SIGNIFICANT CHANGE UP (ref 150–400)
POTASSIUM SERPL-MCNC: 3.8 MMOL/L — SIGNIFICANT CHANGE UP (ref 3.5–5.3)
POTASSIUM SERPL-MCNC: 4.1 MMOL/L — SIGNIFICANT CHANGE UP (ref 3.5–5.3)
POTASSIUM SERPL-SCNC: 3.8 MMOL/L — SIGNIFICANT CHANGE UP (ref 3.5–5.3)
POTASSIUM SERPL-SCNC: 4.1 MMOL/L — SIGNIFICANT CHANGE UP (ref 3.5–5.3)
PROT SERPL-MCNC: 7.1 G/DL — SIGNIFICANT CHANGE UP (ref 6.6–8.7)
PROT SERPL-MCNC: 7.6 G/DL — SIGNIFICANT CHANGE UP (ref 6.6–8.7)
PROT UR-MCNC: 15 MG/DL
PROTHROM AB SERPL-ACNC: 11.9 SEC — SIGNIFICANT CHANGE UP (ref 9.8–12.7)
RBC # BLD: 4.15 M/UL — LOW (ref 4.4–5.2)
RBC # BLD: 4.65 M/UL — SIGNIFICANT CHANGE UP (ref 4.4–5.2)
RBC # FLD: 14.7 % — SIGNIFICANT CHANGE UP (ref 11–15.6)
RBC # FLD: 14.7 % — SIGNIFICANT CHANGE UP (ref 11–15.6)
RBC CASTS # UR COMP ASSIST: NEGATIVE /HPF — SIGNIFICANT CHANGE UP (ref 0–4)
SODIUM SERPL-SCNC: 136 MMOL/L — SIGNIFICANT CHANGE UP (ref 135–145)
SODIUM SERPL-SCNC: 140 MMOL/L — SIGNIFICANT CHANGE UP (ref 135–145)
SP GR SPEC: 1.02 — SIGNIFICANT CHANGE UP (ref 1.01–1.02)
TYPE + AB SCN PNL BLD: SIGNIFICANT CHANGE UP
UROBILINOGEN FLD QL: NEGATIVE MG/DL — SIGNIFICANT CHANGE UP
WBC # BLD: 14.3 K/UL — HIGH (ref 4.8–10.8)
WBC # BLD: 9.1 K/UL — SIGNIFICANT CHANGE UP (ref 4.8–10.8)
WBC # FLD AUTO: 14.3 K/UL — HIGH (ref 4.8–10.8)
WBC # FLD AUTO: 9.1 K/UL — SIGNIFICANT CHANGE UP (ref 4.8–10.8)
WBC UR QL: NEGATIVE — SIGNIFICANT CHANGE UP

## 2017-11-18 PROCEDURE — 93010 ELECTROCARDIOGRAM REPORT: CPT | Mod: 76

## 2017-11-18 PROCEDURE — 82962 GLUCOSE BLOOD TEST: CPT

## 2017-11-18 PROCEDURE — 85027 COMPLETE CBC AUTOMATED: CPT

## 2017-11-18 PROCEDURE — 92978 ENDOLUMINL IVUS OCT C 1ST: CPT | Mod: LC

## 2017-11-18 PROCEDURE — 36415 COLL VENOUS BLD VENIPUNCTURE: CPT

## 2017-11-18 PROCEDURE — 74177 CT ABD & PELVIS W/CONTRAST: CPT | Mod: 26

## 2017-11-18 PROCEDURE — 99223 1ST HOSP IP/OBS HIGH 75: CPT | Mod: GC

## 2017-11-18 PROCEDURE — 99285 EMERGENCY DEPT VISIT HI MDM: CPT

## 2017-11-18 PROCEDURE — C1894: CPT

## 2017-11-18 PROCEDURE — 93010 ELECTROCARDIOGRAM REPORT: CPT

## 2017-11-18 PROCEDURE — 80048 BASIC METABOLIC PNL TOTAL CA: CPT

## 2017-11-18 PROCEDURE — C1874: CPT

## 2017-11-18 PROCEDURE — C1753: CPT

## 2017-11-18 PROCEDURE — 93458 L HRT ARTERY/VENTRICLE ANGIO: CPT | Mod: 59

## 2017-11-18 PROCEDURE — 80053 COMPREHEN METABOLIC PANEL: CPT

## 2017-11-18 PROCEDURE — 84100 ASSAY OF PHOSPHORUS: CPT

## 2017-11-18 PROCEDURE — C1887: CPT

## 2017-11-18 PROCEDURE — C8929: CPT

## 2017-11-18 PROCEDURE — C9600: CPT | Mod: LC

## 2017-11-18 PROCEDURE — C1769: CPT

## 2017-11-18 PROCEDURE — 93005 ELECTROCARDIOGRAM TRACING: CPT

## 2017-11-18 PROCEDURE — 83735 ASSAY OF MAGNESIUM: CPT

## 2017-11-18 RX ORDER — MORPHINE SULFATE 50 MG/1
2 CAPSULE, EXTENDED RELEASE ORAL EVERY 4 HOURS
Qty: 0 | Refills: 0 | Status: DISCONTINUED | OUTPATIENT
Start: 2017-11-18 | End: 2017-11-19

## 2017-11-18 RX ORDER — LISINOPRIL 2.5 MG/1
1 TABLET ORAL
Qty: 30 | Refills: 0 | OUTPATIENT
Start: 2017-11-18 | End: 2017-12-18

## 2017-11-18 RX ORDER — SODIUM CHLORIDE 9 MG/ML
200 INJECTION INTRAMUSCULAR; INTRAVENOUS; SUBCUTANEOUS ONCE
Qty: 0 | Refills: 0 | Status: COMPLETED | OUTPATIENT
Start: 2017-11-18 | End: 2017-11-18

## 2017-11-18 RX ORDER — PANTOPRAZOLE SODIUM 20 MG/1
40 TABLET, DELAYED RELEASE ORAL ONCE
Qty: 0 | Refills: 0 | Status: COMPLETED | OUTPATIENT
Start: 2017-11-18 | End: 2017-11-18

## 2017-11-18 RX ORDER — METOPROLOL TARTRATE 50 MG
1 TABLET ORAL
Qty: 30 | Refills: 0 | OUTPATIENT
Start: 2017-11-18 | End: 2017-12-18

## 2017-11-18 RX ORDER — ATORVASTATIN CALCIUM 80 MG/1
1 TABLET, FILM COATED ORAL
Qty: 30 | Refills: 0 | OUTPATIENT
Start: 2017-11-18 | End: 2017-12-18

## 2017-11-18 RX ORDER — ASPIRIN/CALCIUM CARB/MAGNESIUM 324 MG
1 TABLET ORAL
Qty: 0 | Refills: 0 | COMMUNITY
Start: 2017-11-18

## 2017-11-18 RX ORDER — ASPIRIN/CALCIUM CARB/MAGNESIUM 324 MG
1 TABLET ORAL
Qty: 0 | Refills: 0 | COMMUNITY

## 2017-11-18 RX ORDER — METRONIDAZOLE 500 MG
500 TABLET ORAL ONCE
Qty: 0 | Refills: 0 | Status: COMPLETED | OUTPATIENT
Start: 2017-11-18 | End: 2017-11-18

## 2017-11-18 RX ORDER — CIPROFLOXACIN LACTATE 400MG/40ML
400 VIAL (ML) INTRAVENOUS ONCE
Qty: 0 | Refills: 0 | Status: COMPLETED | OUTPATIENT
Start: 2017-11-18 | End: 2017-11-18

## 2017-11-18 RX ORDER — CLOPIDOGREL BISULFATE 75 MG/1
1 TABLET, FILM COATED ORAL
Qty: 90 | Refills: 3 | OUTPATIENT
Start: 2017-11-18 | End: 2018-11-12

## 2017-11-18 RX ORDER — ONDANSETRON 8 MG/1
4 TABLET, FILM COATED ORAL ONCE
Qty: 0 | Refills: 0 | Status: COMPLETED | OUTPATIENT
Start: 2017-11-18 | End: 2017-11-18

## 2017-11-18 RX ADMIN — SODIUM CHLORIDE 200 MILLILITER(S): 9 INJECTION INTRAMUSCULAR; INTRAVENOUS; SUBCUTANEOUS at 20:01

## 2017-11-18 RX ADMIN — Medication 81 MILLIGRAM(S): at 10:43

## 2017-11-18 RX ADMIN — CLOPIDOGREL BISULFATE 75 MILLIGRAM(S): 75 TABLET, FILM COATED ORAL at 10:43

## 2017-11-18 RX ADMIN — PANTOPRAZOLE SODIUM 40 MILLIGRAM(S): 20 TABLET, DELAYED RELEASE ORAL at 20:02

## 2017-11-18 RX ADMIN — MORPHINE SULFATE 2 MILLIGRAM(S): 50 CAPSULE, EXTENDED RELEASE ORAL at 20:17

## 2017-11-18 RX ADMIN — ONDANSETRON 4 MILLIGRAM(S): 8 TABLET, FILM COATED ORAL at 20:02

## 2017-11-18 RX ADMIN — MORPHINE SULFATE 2 MILLIGRAM(S): 50 CAPSULE, EXTENDED RELEASE ORAL at 20:02

## 2017-11-18 RX ADMIN — LISINOPRIL 2.5 MILLIGRAM(S): 2.5 TABLET ORAL at 10:35

## 2017-11-18 NOTE — H&P ADULT - PROBLEM SELECTOR PLAN 5
c/w atorvastatin 20mg  lipid profile done in 11/16/2017 increased atorvastatin to 40mg  lipid profile done on 11/16/2017 continue atorvastatin 20mg  lipid profile done on 11/16/2017

## 2017-11-18 NOTE — ED ADULT NURSE REASSESSMENT NOTE - NS ED NURSE REASSESS COMMENT FT1
Pt back in ED from CT, placed back on cm, vitals per flow sheet, pt verbalized improvement in pain and nausea, still reports mild nausea, rr even and unlabored, pending ct results,  @ bedside will continue to monitor and reassess

## 2017-11-18 NOTE — ED ADULT NURSE NOTE - PSH
Breast implant status  left breast implant  redone due to collapse of  original implant  H/O heart artery stent    History of tonsillectomy

## 2017-11-18 NOTE — H&P ADULT - PROBLEM SELECTOR PLAN 4
A1c - will get records from PMD's office  glucose 227  accuchecks  sliding scale A1c 11.8 as per previous chart  glucose 227, positive for glucosuria and proteinuria  accuchecks  sliding scale  c/w lisinopril 2.5mg for ppx against diabetic nephropathy  will verify home meds and will start metformin on discharge if pt is not already on it A1c 11.8 as per previous chart  glucose 227, positive for glucosuria and proteinuria  accuchecks and sliding scale insulin  sliding scale  c/w lisinopril 2.5mg for ppx against diabetic nephropathy  will verify home meds and will start metformin on discharge if pt is not already on it

## 2017-11-18 NOTE — ED ADULT NURSE NOTE - PLAN OF CARE
Bedside visitors/Position of comfort/Fall precautions/Call bell/Side rails/Explanation of exam/test/NPO

## 2017-11-18 NOTE — ED PROVIDER NOTE - PROGRESS NOTE DETAILS
Rectal exam: bright red blood present Pt had a BM in the ED with bright red blood. Now 4th BM with bloody stool. CT results as noted.  Rx Cipro/Flagyl.  case d/w Hospitalist/Dr. Marte and will admit

## 2017-11-18 NOTE — H&P ADULT - NSHPLABSRESULTS_GEN_ALL_CORE
CT Abdomen:  Mild circumferential thickening of the mid to distal transverse colon and   descending colon with slight infiltration of the pericolonic fat   reflective of a colitis. No evidence of perforation.    Enlarged fibroid uterus measuring up to 18.5 cm in craniocaudal dimension   with largest discrete fibroid measuring up to 14.5 cm. Bilateral adnexal   cystic lesions, some of which on the right side demonstrate peripheral   calcification and some of which appear tubular in nature. Similar   findings were seen on prior exams from 2006 and the current findings may   be reflective of chronic changes secondary to prior pelvic inflammatory   disease including dilated fluid-filled fallopian tubes. If there are   findings referrable to the pelvis, sonographic evaluation may be   considered.    Hepatomegaly with mild diffuse hepatic steatosis.

## 2017-11-18 NOTE — H&P ADULT - ATTENDING COMMENTS
Patient seen and evaluated in ER for Acute Colitis - continue Cipro and Flagyl. Follow stool studies. She is s/p cardiac cath with stents -- continue Aspirin, Plavix, Metoprolol and Lipitor. She needs better control of her Diabetes.

## 2017-11-18 NOTE — ED PROVIDER NOTE - OBJECTIVE STATEMENT
53 y/o F pt with PMHx of endometriosis, HLD, and borderline DM presents to ED c/o sharp, suprapubic abdominal pain, vomiting, 3 episodes of bloody stool, and dizziness x4 hours s/p cardiac stent placement yesterday by Dr. Rajat Minaya. Pt reports she was in the hospital over night and was discharged this am. Pt is currently taking ASA 81 mg and Plavix. She notes she ate eggs and almonds today and was able to tolerate water. Pt states "I was constipated earlier today, then had a BM. After that, I had bright red blood from my rectum." She had another episode of bloody stool after which she describes as blood clots. The last two episodes were just blood with no stool. Pt denies fever, chills, CP, SOB, back pain, dysuria, hematuria, headache, and visual changes. No further complaints at this time.   PMD: Dr. Godoy

## 2017-11-18 NOTE — ED ADULT NURSE NOTE - OBJECTIVE STATEMENT
Pt a&ox3 c/o rectal bleeding starting this AM, pt reports 2x occurrences of bloody stool states "the first time was bright, the second time was like clots". Pt reports d/c from [facility] today s/p stent placement, site to right wrist asymp, mild bruising noted, no hematoma cap refill <2sec +rom. MD [place] bedside for rectal exam @ this time, pt on cm.  @ bedside, #20g iv placed to left ac blood drawn and sent to lab, medicated per md orders. Updated on POC, Verbalized understanding of providing ua, will continue to monitor and reassess

## 2017-11-18 NOTE — H&P ADULT - PMH
CAD (coronary artery disease)    DM (diabetes mellitus)  tried nutritionist and diet change 2016   when  first  hgba1c  was elevated,  repeat is  11  to  f/u with md  Former smoker    Hemoglobin A1c 8.0% or greater    Hyperlipidemia, unspecified hyperlipidemia type    Uterine leiomyoma, unspecified location CAD (coronary artery disease)    DM (diabetes mellitus)  tried nutritionist and diet change 2016   when  first  hgba1c  was elevated,  repeat is  11  to  f/u with md  Former smoker    Hyperlipidemia, unspecified hyperlipidemia type    Uterine leiomyoma, unspecified location

## 2017-11-18 NOTE — H&P ADULT - NSHPSOCIALHISTORY_GEN_ALL_CORE
Former smoker; 1/2 pack per day x 20 yrs; quit smoking 8-10 yrs ago  EtOH: occassional  Drugs: denies

## 2017-11-18 NOTE — H&P ADULT - PROBLEM SELECTOR PLAN 2
Secondary to colitis vs plavix-induced  Positive occult blood  H/H stable at 12.7/38.7 - continue to trend  type and screen  Will discuss with cardio about risks/benefits of holding plavix given recent cath Secondary to colitis  Positive occult blood  H/H stable at 12.7/38.7 - continue to trend  type and screen  will c/w asa and plavix for now Secondary to colitis  H/H stable at 12.7/38.7 - continue to trend  type and screen  will c/w asa and plavix for now

## 2017-11-18 NOTE — PROGRESS NOTE ADULT - ASSESSMENT
Procedure: Left heart catheterization and PCI with YUE of the mLCX  Discharge Diagnosis: CAD of the native LCX and diagonal with stable angina    1. Discharge home today    2. Follow up as an outpatient with: [facility]    3. Post procedure teaching done including importance of medication adherence and access site care and monitoring.    4. DAPT: Plavix 75mg daily with aspirin 81mg daily    5. Statin: Lipitor 20mg daily    6. Beta Blocker: N/A    7. ACEI/ARB: Lisinopril 2.5mg daily

## 2017-11-18 NOTE — CHART NOTE - NSCHARTNOTEFT_GEN_A_CORE
Patient feeling a lot better.  No more nausea or dizziness.  Echo w no acute pathology and LVEF of 60-65%.  Labs were reviewed.  Diabetic orders were placed and patient was placed on d5w + 0.9%NS at 75mL/hr until primary team reevaluates and decides on a diet for the patient.  Patient otherwise stable.

## 2017-11-18 NOTE — H&P ADULT - HISTORY OF PRESENT ILLNESS
51 y/o female with PMH of CAD s/p 2 YUE to Lcx, HLD, DM (not on insulin), uterine fibroids, endometriosis, presents with complaints of rectal bleeding. Patient was discharged from [facility] this morning after undergoing cath with 2 stents placed. She was discharged on ASA 81mg and Plavix 75mg. Patient took her plavix at noon, and a 2-3 hrs later felt nauseas and had 2 episodes of NBNB vomiting. She also felt a little constipated, after which she had 1 episode of loose stools with bright red blood and clots. Associated with sharp suprapubic abdominal pain that is intermittent. Patient is not tolerating po at the moment and states she did not eat anything out of the ordinary today. While in the ED, patient had 2 episodes of bright red blood per rectum without stools, about 1 teaspoon in quantity. Patient states she is feeling a little lightheaded and dizzy. Denies chest pain, difficulty breathing, fever, chills, dysuria. 53 y/o female with PMH of CAD s/p 2 YUE to Lcx, HLD, DM (not on insulin), uterine fibroids, endometriosis, presents with complaints of rectal bleeding. Patient was discharged from [facility] this morning after undergoing cath with 2 stents. She was discharged on ASA 81mg and Plavix 75mg. Patient took her plavix at noon, and 2-3 hrs later felt nauseas and had 2 episodes of NBNB vomiting. She also felt a little constipated initially, after which she had 1 episode of loose stools with bright red blood and clots. Associated with sharp suprapubic abdominal pain that is intermittent. Patient is not tolerating po at the moment and states she did not eat anything out of the ordinary today. While in the ED, patient had 2 episodes of bright red blood per rectum without stools, about 1 teaspoon in quantity. Patient states she is feeling a little lightheaded and dizzy. Denies chest pain, difficulty breathing, fever, chills, dysuria. 51 y/o female with PMH of CAD s/p 2 YUE to Lcx, HLD, DM (not on insulin), uterine fibroids, endometriosis, presents with complaints of rectal bleeding. Patient was discharged from [facility] this morning after undergoing cath with 2 stents. She was discharged on ASA 81mg and Plavix 75mg. Patient took her plavix at noon, and 2-3 hrs later felt nauseas and had 2 episodes of NBNB vomiting. She also felt a little constipated initially, after which she had 1 episode of loose stools with bright red blood. Associated with sharp suprapubic abdominal pain that is intermittent. Patient is not tolerating po at the moment and states she did not eat anything out of the ordinary today. While in the ED, patient had 2 episodes of bright red blood per rectum without stools, about 1 teaspoon in quantity. Patient states she is feeling a little lightheaded and dizzy. Denies chest pain, difficulty breathing, fever, chills, dysuria.

## 2017-11-18 NOTE — ED STATDOCS - PROGRESS NOTE DETAILS
51 y/o F with hx of endometriosis, 2x cardiac stents (placed yesterday) presents to the ED c/o abdominal pain, vomiting, bloody stool that developed today. Pt with recent hospitalization at [facility], discharged earlier today after receiving 2 stents (Dr. Rajat Minaya) placed yesterday. Pt states that a few hours after procedure while at [facility], she felt lightheaded and her blood pressure had dropped. Pt was given medication at [facility] and felt better so she felt safe to be discharged home. Pt took her prescribed medication around 1200 after discharge, including Plavix, and afterwards developed her presenting sx. She states her second episode of bloody bowel movement appeared congealed, prompting her to seek ED evaluation. She describes her abdominal pain as 5-6 out of 10 in severity and stool with bright red blood. Pt has never experienced severe bloody stool in the past. Denies recent colonoscopy, Abx. Denies fever. Pt mentions her dizziness/nausea has persisted in ED. PMD is Dr. Em. No further complaints at this time. Pt to be moved to main ED for complete evaluation by another provider.

## 2017-11-18 NOTE — H&P ADULT - PROBLEM SELECTOR PLAN 3
s/p cath with 2 stents in Lcx yesterday (11/17/17)  [place] [facility]  Telemetry  Will discuss with cardio about risks/benefits of holding plavix given recent cath  c/w metoprolol succinate 25mg qd, lisinopril 2.5mg, atorvastatin 20mg s/p cath with 2 stents in Lcx yesterday (11/17/17)  Cardiac monitor  c/w metoprolol succinate 25mg qd, atorvastatin 20mg, asa 81mg, plavix 75mg

## 2017-11-18 NOTE — ED ADULT TRIAGE NOTE - CHIEF COMPLAINT QUOTE
Patient reports 2 cardiac stent placed yesterday. patient reports abdominal, vomiting and blood in stool (bright red). recently started on Plavix.

## 2017-11-18 NOTE — H&P ADULT - NSHPPHYSICALEXAM_GEN_ALL_CORE
Vital Signs Last 24 Hrs  T(C): 36.4 (18 Nov 2017 18:22), Max: 36.7 (18 Nov 2017 06:57)  T(F): 97.6 (18 Nov 2017 18:22), Max: 98 (18 Nov 2017 06:57)  HR: 80 (18 Nov 2017 21:21) (73 - 92)  BP: 131/77 (18 Nov 2017 21:21) (100/59 - 142/84)  BP(mean): --  RR: 18 (18 Nov 2017 21:21) (16 - 18)  SpO2: 98% (18 Nov 2017 21:21) (97% - 98%)    General: NAD, resting comfortably in bed, appears a little anxious  HEENT: NC/AT, PERRLA, EOMI  Throat: non-erythematous, no exudates  Neck: thyroid normal, no nodules, no lymphadenopathy, no JVD  Lungs: CTA bilaterally, no rhonchi, no wheezes  Cardio: S1S2+, RRR, no murmurs  Abdominal: soft, non-tender, mildly distended, BS+ in all four quadrants, no rebound or guarding  Rectal: pt denied rectal exam   Back: no CVA tenderness, no ulcers visualized in the back  Extremities: no edema, no calf tenderness, no ulcers in the feet  Neuro: AAOx3, CN 2-12 grossly intact. Sensation grossly intact in all extremities, 5/5 strength in all extremities Vital Signs Last 24 Hrs  T(C): 36.4 (18 Nov 2017 18:22), Max: 36.7 (18 Nov 2017 06:57)  T(F): 97.6 (18 Nov 2017 18:22), Max: 98 (18 Nov 2017 06:57)  HR: 80 (18 Nov 2017 21:21) (73 - 92)  BP: 131/77 (18 Nov 2017 21:21) (100/59 - 142/84)  BP(mean): --  RR: 18 (18 Nov 2017 21:21) (16 - 18)  SpO2: 98% (18 Nov 2017 21:21) (97% - 98%)    General: NAD, resting comfortably in bed, appears a little anxious  HEENT: NC/AT, PERRLA, EOMI  Throat: non-erythematous, no exudates  Neck: thyroid normal, no nodules, no lymphadenopathy, no JVD  Lungs: CTA bilaterally, no rhonchi, no wheezes  Cardio: S1S2+, RRR, no murmurs  Abdominal: soft, mild diffuse tenderness, mildly distended, uterus enlarged and firm with irregular contour, BS+ in all four quadrants, no rebound or guarding  Rectal: pt denied rectal exam; as per ED physician, bright red blood on exam, no stool in rectal vault  Back: no CVA tenderness, no ulcers visualized in the back  Extremities: no edema, no calf tenderness, no ulcers in the feet  Neuro: AAOx3, CN 2-12 grossly intact. Sensation grossly intact in all extremities, 5/5 strength in all extremities Vital Signs   T(C): 36.4 (18 Nov 2017 18:22), Max: 36.7 (18 Nov 2017 06:57)  T(F): 97.6 (18 Nov 2017 18:22), Max: 98 (18 Nov 2017 06:57)  HR: 80 (18 Nov 2017 21:21) (73 - 92)  BP: 131/77 (18 Nov 2017 21:21) (100/59 - 142/84)  RR: 18 (18 Nov 2017 21:21) (16 - 18)  SpO2: 98% (18 Nov 2017 21:21) (97% - 98%)    General: NAD, resting comfortably in bed, appears a little anxious  HEENT: NC/AT, PERRLA, EOMI  Throat: non-erythematous, no exudates  Neck: thyroid normal, no nodules, no lymphadenopathy, no JVD  Lungs: CTA bilaterally, no rhonchi, no wheezes  Cardio: S1S2+, RRR, no murmurs  Abdominal: soft, mild diffuse tenderness, mildly distended, uterus enlarged and firm with irregular contour, BS+ in all four quadrants, no rebound or guarding  Rectal: pt denied rectal exam; as per ED physician, bright red blood on exam, no stool in rectal vault  Back: no CVA tenderness, no ulcers visualized in the back  Extremities: no edema, no calf tenderness, no ulcers in the feet  Neuro: AAOx3, CN 2-12 grossly intact. Sensation grossly intact in all extremities, 5/5 strength in all extremities

## 2017-11-18 NOTE — H&P ADULT - PROBLEM SELECTOR PLAN 1
Cipro 400mg, flagyl 500mg  zofran 4mg IV q6h prn for nausea/vomiting  acetaminophen prn for fever/ mild pain  percocet prn for moderate-severe pain  clear liquid diet; advance as tolerated afebrile, leukocytosis 14.3; likely infectious etiology  Cipro 400mg q12h, flagyl 500mg tid  Zofran 4mg IV q6h prn for nausea/vomiting  acetaminophen prn for fever/ mild pain  percocet prn for moderate-severe pain  clear liquid diet; advance as tolerated  f/u stool cx, ova& parasite  f/u c diff

## 2017-11-18 NOTE — PROGRESS NOTE ADULT - SUBJECTIVE AND OBJECTIVE BOX
SUBJECTIVE    REVIEW OF SYSTEMS    General: Not in any pain	    Skin/Breast: No rash  	  ENMT: No visual problems, no sore throat	    Respiratory and Thorax: No cough, No CP, No SOB  	  Cardiovascular: No CP, No palpitations    Gastrointestinal: No Abd pain, No N/V/D    Musculoskeletal: No Joint pain, No back pain	    Neurological: No headache    Psychiatric: No anxiety      OBJECTIVE    Vital Signs Last 24 Hrs  T(C): 36.7 (11-18-17 @ 06:57), Max: 36.7 (11-18-17 @ 06:57)  T(F): 98 (11-18-17 @ 06:57), Max: 98 (11-18-17 @ 06:57)  HR: 81 (11-18-17 @ 11:15) (71 - 89)  BP: 121/80 (11-18-17 @ 11:15) (89/54 - 127/73)  BP(mean): --  RR: 16 (11-18-17 @ 11:15) (16 - 23)  SpO2: 98% (11-18-17 @ 11:15) (94% - 100%)    PHYSICAL EXAM:    Constitutional: Not in any distress    Eyes: No conjunctival injection    ENMT: No oral lesions    Neck: No nodes, no adenopathy    Back: Straight, no defects    Respiratory: clear b/l    Cardiovascular: RRR, no murmur    Gastrointestinal: soft, NT, ND    Extremities: No edema, no erythema    Neurological: no focal deficit    Skin: No rash      MEDICATIONS  (STANDING):  aspirin  chewable 81 milliGRAM(s) Chew daily  atorvastatin 20 milliGRAM(s) Oral at bedtime  clopidogrel Tablet 75 milliGRAM(s) Oral daily  dextrose 5% + sodium chloride 0.9%. 1000 milliLiter(s) (75 mL/Hr) IV Continuous <Continuous>  dextrose 5%. 1000 milliLiter(s) (50 mL/Hr) IV Continuous <Continuous>  dextrose 50% Injectable 12.5 Gram(s) IV Push once  dextrose 50% Injectable 25 Gram(s) IV Push once  dextrose 50% Injectable 25 Gram(s) IV Push once  insulin lispro (HumaLOG) corrective regimen sliding scale   SubCutaneous three times a day before meals  insulin lispro (HumaLOG) corrective regimen sliding scale   SubCutaneous at bedtime  lisinopril 2.5 milliGRAM(s) Oral daily    MEDICATIONS  (PRN):  acetaminophen   Tablet 650 milliGRAM(s) Oral every 6 hours PRN pain  ALPRAZolam 0.25 milliGRAM(s) Oral every 6 hours PRN anxiety  dextrose Gel 1 Dose(s) Oral once PRN Blood Glucose LESS THAN 70 milliGRAM(s)/deciLiter  glucagon  Injectable 1 milliGRAM(s) IntraMuscular once PRN Glucose <70 milliGRAM(s)/deciLiter  zolpidem 5 milliGRAM(s) Oral at bedtime PRN Insomnia                              11.2   9.1   )-----------( 291      ( 18 Nov 2017 09:28 )             34.7     18 Nov 2017 09:28    140    |  102    |  6.0    ----------------------------<  177    4.1     |  23.0   |  0.54     Ca    9.1        18 Nov 2017 09:28  Phos  3.7       17 Nov 2017 23:01  Mg     1.9       17 Nov 2017 23:01    TPro  7.1    /  Alb  3.9    /  TBili  0.6    /  DBili  x      /  AST  18     /  ALT  22     /  AlkPhos  68     18 Nov 2017 09:28    Allergies    erythromycin (Unknown)    Intolerances

## 2017-11-18 NOTE — PROGRESS NOTE ADULT - SUBJECTIVE AND OBJECTIVE BOX
Department of Cardiology                                                                  [facility]/[place] [address] [address] [facility]                                                            Telephone: [phone]. Fax:[phone]                                                                                         PCI NOTE       Subjective:  52y  Female who had a left heart catheterization which showed:  VENTRICLES: LVEF 55%  CORONARY VESSELS: The coronary circulation is right dominant.  LM:     --  LM: Normal.  LAD:     --  Proximal LAD: There was a 20 % stenosis.  --  D1: There was a 25 % stenosis.  --  D2: There was a 90 % stenosis.  CX:     --  Mid circumflex: There was a 95 % stenosis treated with a SYNERGY 2.25MM X 16MM drug-eluting stent and a SYNERGY 2.75MM X 20MM drug-eluting stent .  --  OM1: There was a 95 % stenosis.  RCA:     --  RCA: Angiography showed minor luminal irregularities with no flow limiting lesions.  --  RPDA: There was a 25 % stenosis.        PAST MEDICAL & SURGICAL HISTORY:  SOB (shortness of breath)  Chest discomfort  DM (diabetes mellitus): tried nutritionist and diet change 2016   when  first  hgba1c  was elevated,  repeat is  11  to  f/u with md  Former smoker  Hemoglobin A1c 8.0% or greater  Abnormal EKG  Elevated blood sugar  Breast implant status: left breast implant  redone due to collapse of  original implant  History of tonsillectomy      Home Medications:  aspirin 81 mg oral tablet: 1 tab(s) orally once a day (16 Nov 2017 14:18)  Vitamin B12 1000 mcg/mL injectable solution: 1 dose(s) injectable once a week (16 Nov 2017 14:18)    Patient is a 52y old  Female who presents with a chief complaint of post pci LCX stent (17 Nov 2017 18:58)        HPI: This is a 53 yo female that has recently new diagnosis of Diabetes with a HGA1C of 11.8 and hyperlipidemia.  She presented to her PMDs office with chest pressure/tightness in chest.  An EKG was preformed and according to her it was abnormal. She was referred to [facility] for further work up.   She has no other Medical problems at this time and has not started on any new medications   on 11/14/2017 she had an NST that revealed hypokinesis as well as abnormal perfusion of the basal inferior and inferior lateral segments  involving the LCX territory .  EF was 61% .        General: No fatigue, no fevers/chills  Respiratory: No dyspnea, no cough, no wheeze  CV: No chest pain, no palpitations  Abd: No nausea  Neuro: No headache, no dizziness    Allergies: erythromycin (Unknown)      Objective:  Vital Signs Last 24 Hrs  T(C): 36.7 (18 Nov 2017 06:57), Max: 36.7 (18 Nov 2017 06:57)  T(F): 98 (18 Nov 2017 06:57), Max: 98 (18 Nov 2017 06:57)  HR: 75 (18 Nov 2017 06:57) (71 - 89)  BP: 100/59 (18 Nov 2017 06:57) (89/54 - 127/73)  RR: 16 (18 Nov 2017 06:57) (16 - 23)  SpO2: 95% (17 Nov 2017 23:00) (94% - 100%)    CM: SR  Neuro: A&OX3, CN 2-12 intact  HEENT: NC, AT  Lungs: CTA B/L  CV: S1, S2, no murmur, RRR  Abd: Soft  Extremity: Right radial band: no bleeding, fingers warm with good cap refil  EKG: NSR with inverted T wave in V6 Department of Cardiology                                                                  [facility]/[place] [address] [address] [facility]                                                            Telephone: [phone]. Fax:[phone]                                                                                         PCI NOTE       Subjective:  52y  Female who had a left heart catheterization which showed:  VENTRICLES: LVEF 55%  CORONARY VESSELS: The coronary circulation is right dominant.  LM:     --  LM: Normal.  LAD:     --  Proximal LAD: There was a 20 % stenosis.  --  D1: There was a 25 % stenosis.  --  D2: There was a 90 % stenosis.  CX:     --  Mid circumflex: There was a 95 % stenosis treated with a SYNERGY 2.25MM X 16MM drug-eluting stent and a SYNERGY 2.75MM X 20MM drug-eluting stent .  --  OM1: There was a 95 % stenosis.  RCA:     --  RCA: Angiography showed minor luminal irregularities with no flow limiting lesions.  --  RPDA: There was a 25 % stenosis.    AS per the NP note from last night, the patient was noted to be hypotensive by RN Davey, BP repeated and remained approximately 80/50, and HR dropped to the 40's she started to vomit and became pale.  She denied chest pain.  Procedure site CDI, no bleeding, no hematoma, able to move all digits with capillary refill < 3 seconds, fingers warm.  She was medicated with Zofran 4 mg IVP and Atropine1 mg IVP put into Trendelenburg and given NS bolus with brisk return of HR, BP and color.  Rapid response called. Stat EKG was unchanged from baseline EKG, stat ECHO ordered and in progress.  The echo showed:   Left Ventricle: The left ventricular internal cavity size is normal. Global LV systolic function was normal. Left ventricular ejection fraction, by visual estimation, is 60 to 65%. Spectral Doppler shows normal pattern of LV diastolic filling.  Right Ventricle: Normal right ventricular size and function. TV S' 0.1 m/s.  Left Atrium: The left atrium is normal in size.  Right Atrium: The right atrium is normal in size.  Pericardium: There is no evidence of pericardial effusion.  Mitral Valve: Structurally normal mitral valve, with normal leaflet excursion. Trace mitral valve regurgitation is seen.  Tricuspid Valve: Trivial tricuspid regurgitation is visualized. Adequate TR velocity was not obtained to accurately assess RVSP.  Aortic Valve: The aortic valve was not well visualized. No evidence of aortic valve regurgitation is seen.  Pulmonic Valve: The pulmonic valve was not well visualized. No indication of pulmonic valve regurgitation.  Aorta: The aortic root is normal in size and structure. The ascending aorta was not well visualized.  Pulmonary Artery: The pulmonary artery is not well seen.  Venous: The pulmonary veins appear normal. The inferior vena cava is normal. The inferior vena cava was normal sized, with respiratory size variation greater than 50%.        PAST MEDICAL & SURGICAL HISTORY:  SOB (shortness of breath)  Chest discomfort  DM (diabetes mellitus): tried nutritionist and diet change 2016   when  first  hgba1c  was elevated,  repeat is  11  to  f/u with md  Former smoker  Hemoglobin A1c 8.0% or greater  Abnormal EKG  Elevated blood sugar  Breast implant status: left breast implant  redone due to collapse of  original implant  History of tonsillectomy      Home Medications:  aspirin 81 mg oral tablet: 1 tab(s) orally once a day (16 Nov 2017 14:18)  Vitamin B12 1000 mcg/mL injectable solution: 1 dose(s) injectable once a week (16 Nov 2017 14:18)    Patient is a 52y old  Female who presents with a chief complaint of post pci LCX stent (17 Nov 2017 18:58)        HPI: This is a 51 yo female that has recently new diagnosis of Diabetes with a HGA1C of 11.8 and hyperlipidemia.  She presented to her PMDs office with chest pressure/tightness in chest.  An EKG was preformed and according to her it was abnormal. She was referred to [facility] for further work up.   She has no other Medical problems at this time and has not started on any new medications   on 11/14/2017 she had an NST that revealed hypokinesis as well as abnormal perfusion of the basal inferior and inferior lateral segments  involving the LCX territory .  EF was 61% .        General: No fatigue, no fevers/chills  Respiratory: No dyspnea, no cough, no wheeze  CV: No chest pain, no palpitations  Abd: No nausea  Neuro: No headache, no dizziness    Allergies: erythromycin (Unknown)      Objective:  Vital Signs Last 24 Hrs  T(C): 36.7 (18 Nov 2017 06:57), Max: 36.7 (18 Nov 2017 06:57)  T(F): 98 (18 Nov 2017 06:57), Max: 98 (18 Nov 2017 06:57)  HR: 75 (18 Nov 2017 06:57) (71 - 89)  BP: 100/59 (18 Nov 2017 06:57) (89/54 - 127/73)  RR: 16 (18 Nov 2017 06:57) (16 - 23)  SpO2: 95% (17 Nov 2017 23:00) (94% - 100%)    CM: SR  Neuro: A&OX3, CN 2-12 intact  HEENT: NC, AT  Lungs: CTA B/L  CV: S1, S2, no murmur, RRR  Abd: Soft  Extremity: Right radial band: no bleeding, fingers warm with good cap refil  EKG: NSR with inverted T wave in V6

## 2017-11-19 DIAGNOSIS — K52.9 NONINFECTIVE GASTROENTERITIS AND COLITIS, UNSPECIFIED: ICD-10-CM

## 2017-11-19 DIAGNOSIS — K62.5 HEMORRHAGE OF ANUS AND RECTUM: ICD-10-CM

## 2017-11-19 DIAGNOSIS — Z29.9 ENCOUNTER FOR PROPHYLACTIC MEASURES, UNSPECIFIED: ICD-10-CM

## 2017-11-19 DIAGNOSIS — E11.9 TYPE 2 DIABETES MELLITUS WITHOUT COMPLICATIONS: ICD-10-CM

## 2017-11-19 DIAGNOSIS — I25.10 ATHEROSCLEROTIC HEART DISEASE OF NATIVE CORONARY ARTERY WITHOUT ANGINA PECTORIS: ICD-10-CM

## 2017-11-19 DIAGNOSIS — E78.5 HYPERLIPIDEMIA, UNSPECIFIED: ICD-10-CM

## 2017-11-19 LAB
ANION GAP SERPL CALC-SCNC: 18 MMOL/L — HIGH (ref 5–17)
BASOPHILS # BLD AUTO: 0 K/UL — SIGNIFICANT CHANGE UP (ref 0–0.2)
BASOPHILS NFR BLD AUTO: 0.2 % — SIGNIFICANT CHANGE UP (ref 0–2)
BUN SERPL-MCNC: 7 MG/DL — LOW (ref 8–20)
C DIFF BY PCR RESULT: SIGNIFICANT CHANGE UP
C DIFF TOX GENS STL QL NAA+PROBE: SIGNIFICANT CHANGE UP
CALCIUM SERPL-MCNC: 8.7 MG/DL — SIGNIFICANT CHANGE UP (ref 8.6–10.2)
CHLORIDE SERPL-SCNC: 101 MMOL/L — SIGNIFICANT CHANGE UP (ref 98–107)
CO2 SERPL-SCNC: 20 MMOL/L — LOW (ref 22–29)
CREAT SERPL-MCNC: 0.5 MG/DL — SIGNIFICANT CHANGE UP (ref 0.5–1.3)
CRP SERPL-MCNC: 0.9 MG/DL — HIGH (ref 0–0.4)
CULTURE RESULTS: NO GROWTH — SIGNIFICANT CHANGE UP
EOSINOPHIL # BLD AUTO: 0.1 K/UL — SIGNIFICANT CHANGE UP (ref 0–0.5)
EOSINOPHIL NFR BLD AUTO: 0.6 % — SIGNIFICANT CHANGE UP (ref 0–6)
ERYTHROCYTE [SEDIMENTATION RATE] IN BLOOD: 60 MM/HR — HIGH (ref 0–20)
GLUCOSE BLDC GLUCOMTR-MCNC: 112 MG/DL — HIGH (ref 70–99)
GLUCOSE BLDC GLUCOMTR-MCNC: 146 MG/DL — HIGH (ref 70–99)
GLUCOSE BLDC GLUCOMTR-MCNC: 154 MG/DL — HIGH (ref 70–99)
GLUCOSE BLDC GLUCOMTR-MCNC: 165 MG/DL — HIGH (ref 70–99)
GLUCOSE SERPL-MCNC: 150 MG/DL — HIGH (ref 70–115)
HCT VFR BLD CALC: 34.3 % — LOW (ref 37–47)
HCT VFR BLD CALC: 34.5 % — LOW (ref 37–47)
HGB BLD-MCNC: 11.4 G/DL — LOW (ref 12–16)
HGB BLD-MCNC: 11.6 G/DL — LOW (ref 12–16)
LYMPHOCYTES # BLD AUTO: 18.4 % — LOW (ref 20–55)
LYMPHOCYTES # BLD AUTO: 2.3 K/UL — SIGNIFICANT CHANGE UP (ref 1–4.8)
MAGNESIUM SERPL-MCNC: 1.8 MG/DL — SIGNIFICANT CHANGE UP (ref 1.6–2.6)
MCHC RBC-ENTMCNC: 27.8 PG — SIGNIFICANT CHANGE UP (ref 27–31)
MCHC RBC-ENTMCNC: 33.8 G/DL — SIGNIFICANT CHANGE UP (ref 32–36)
MCV RBC AUTO: 82.1 FL — SIGNIFICANT CHANGE UP (ref 81–99)
MONOCYTES # BLD AUTO: 1.2 K/UL — HIGH (ref 0–0.8)
MONOCYTES NFR BLD AUTO: 9.8 % — SIGNIFICANT CHANGE UP (ref 3–10)
NEUTROPHILS # BLD AUTO: 8.9 K/UL — HIGH (ref 1.8–8)
NEUTROPHILS NFR BLD AUTO: 70.6 % — SIGNIFICANT CHANGE UP (ref 37–73)
OB PNL STL: POSITIVE
PHOSPHATE SERPL-MCNC: 4.2 MG/DL — SIGNIFICANT CHANGE UP (ref 2.4–4.7)
PLATELET # BLD AUTO: 311 K/UL — SIGNIFICANT CHANGE UP (ref 150–400)
POTASSIUM SERPL-MCNC: 3.6 MMOL/L — SIGNIFICANT CHANGE UP (ref 3.5–5.3)
POTASSIUM SERPL-SCNC: 3.6 MMOL/L — SIGNIFICANT CHANGE UP (ref 3.5–5.3)
RBC # BLD: 4.18 M/UL — LOW (ref 4.4–5.2)
RBC # FLD: 14.7 % — SIGNIFICANT CHANGE UP (ref 11–15.6)
SODIUM SERPL-SCNC: 139 MMOL/L — SIGNIFICANT CHANGE UP (ref 135–145)
SPECIMEN SOURCE: SIGNIFICANT CHANGE UP
WBC # BLD: 12.6 K/UL — HIGH (ref 4.8–10.8)
WBC # FLD AUTO: 12.6 K/UL — HIGH (ref 4.8–10.8)

## 2017-11-19 PROCEDURE — 99223 1ST HOSP IP/OBS HIGH 75: CPT

## 2017-11-19 RX ORDER — ASPIRIN/CALCIUM CARB/MAGNESIUM 324 MG
81 TABLET ORAL DAILY
Qty: 0 | Refills: 0 | Status: DISCONTINUED | OUTPATIENT
Start: 2017-11-19 | End: 2017-11-23

## 2017-11-19 RX ORDER — INSULIN LISPRO 100/ML
VIAL (ML) SUBCUTANEOUS
Qty: 0 | Refills: 0 | Status: DISCONTINUED | OUTPATIENT
Start: 2017-11-19 | End: 2017-11-23

## 2017-11-19 RX ORDER — ATORVASTATIN CALCIUM 80 MG/1
20 TABLET, FILM COATED ORAL AT BEDTIME
Qty: 0 | Refills: 0 | Status: DISCONTINUED | OUTPATIENT
Start: 2017-11-19 | End: 2017-11-23

## 2017-11-19 RX ORDER — SACCHAROMYCES BOULARDII 250 MG
250 POWDER IN PACKET (EA) ORAL
Qty: 0 | Refills: 0 | Status: DISCONTINUED | OUTPATIENT
Start: 2017-11-19 | End: 2017-11-23

## 2017-11-19 RX ORDER — CLOPIDOGREL BISULFATE 75 MG/1
75 TABLET, FILM COATED ORAL DAILY
Qty: 0 | Refills: 0 | Status: DISCONTINUED | OUTPATIENT
Start: 2017-11-19 | End: 2017-11-23

## 2017-11-19 RX ORDER — CIPROFLOXACIN LACTATE 400MG/40ML
400 VIAL (ML) INTRAVENOUS EVERY 12 HOURS
Qty: 0 | Refills: 0 | Status: DISCONTINUED | OUTPATIENT
Start: 2017-11-19 | End: 2017-11-23

## 2017-11-19 RX ORDER — OXYCODONE AND ACETAMINOPHEN 5; 325 MG/1; MG/1
2 TABLET ORAL EVERY 4 HOURS
Qty: 0 | Refills: 0 | Status: DISCONTINUED | OUTPATIENT
Start: 2017-11-19 | End: 2017-11-23

## 2017-11-19 RX ORDER — OXYCODONE AND ACETAMINOPHEN 5; 325 MG/1; MG/1
1 TABLET ORAL EVERY 4 HOURS
Qty: 0 | Refills: 0 | Status: DISCONTINUED | OUTPATIENT
Start: 2017-11-19 | End: 2017-11-23

## 2017-11-19 RX ORDER — METOPROLOL TARTRATE 50 MG
25 TABLET ORAL DAILY
Qty: 0 | Refills: 0 | Status: DISCONTINUED | OUTPATIENT
Start: 2017-11-19 | End: 2017-11-19

## 2017-11-19 RX ORDER — SODIUM CHLORIDE 9 MG/ML
1000 INJECTION, SOLUTION INTRAVENOUS
Qty: 0 | Refills: 0 | Status: DISCONTINUED | OUTPATIENT
Start: 2017-11-19 | End: 2017-11-20

## 2017-11-19 RX ORDER — LISINOPRIL 2.5 MG/1
2.5 TABLET ORAL DAILY
Qty: 0 | Refills: 0 | Status: DISCONTINUED | OUTPATIENT
Start: 2017-11-19 | End: 2017-11-19

## 2017-11-19 RX ORDER — METRONIDAZOLE 500 MG
500 TABLET ORAL EVERY 8 HOURS
Qty: 0 | Refills: 0 | Status: DISCONTINUED | OUTPATIENT
Start: 2017-11-19 | End: 2017-11-22

## 2017-11-19 RX ORDER — ACETAMINOPHEN 500 MG
650 TABLET ORAL EVERY 6 HOURS
Qty: 0 | Refills: 0 | Status: DISCONTINUED | OUTPATIENT
Start: 2017-11-19 | End: 2017-11-23

## 2017-11-19 RX ORDER — INFLUENZA VIRUS VACCINE 15; 15; 15; 15 UG/.5ML; UG/.5ML; UG/.5ML; UG/.5ML
0.5 SUSPENSION INTRAMUSCULAR ONCE
Qty: 0 | Refills: 0 | Status: DISCONTINUED | OUTPATIENT
Start: 2017-11-19 | End: 2017-11-23

## 2017-11-19 RX ORDER — INSULIN LISPRO 100/ML
VIAL (ML) SUBCUTANEOUS
Qty: 0 | Refills: 0 | Status: DISCONTINUED | OUTPATIENT
Start: 2017-11-19 | End: 2017-11-19

## 2017-11-19 RX ORDER — ONDANSETRON 8 MG/1
4 TABLET, FILM COATED ORAL EVERY 6 HOURS
Qty: 0 | Refills: 0 | Status: DISCONTINUED | OUTPATIENT
Start: 2017-11-19 | End: 2017-11-23

## 2017-11-19 RX ADMIN — CLOPIDOGREL BISULFATE 75 MILLIGRAM(S): 75 TABLET, FILM COATED ORAL at 11:35

## 2017-11-19 RX ADMIN — ATORVASTATIN CALCIUM 20 MILLIGRAM(S): 80 TABLET, FILM COATED ORAL at 22:32

## 2017-11-19 RX ADMIN — OXYCODONE AND ACETAMINOPHEN 1 TABLET(S): 5; 325 TABLET ORAL at 17:45

## 2017-11-19 RX ADMIN — SODIUM CHLORIDE 100 MILLILITER(S): 9 INJECTION, SOLUTION INTRAVENOUS at 09:11

## 2017-11-19 RX ADMIN — SODIUM CHLORIDE 100 MILLILITER(S): 9 INJECTION, SOLUTION INTRAVENOUS at 06:06

## 2017-11-19 RX ADMIN — Medication 200 MILLIGRAM(S): at 06:05

## 2017-11-19 RX ADMIN — OXYCODONE AND ACETAMINOPHEN 1 TABLET(S): 5; 325 TABLET ORAL at 17:00

## 2017-11-19 RX ADMIN — Medication 100 MILLIGRAM(S): at 17:19

## 2017-11-19 RX ADMIN — OXYCODONE AND ACETAMINOPHEN 1 TABLET(S): 5; 325 TABLET ORAL at 09:11

## 2017-11-19 RX ADMIN — Medication 100 MILLIGRAM(S): at 04:23

## 2017-11-19 RX ADMIN — Medication 100 MILLIGRAM(S): at 09:08

## 2017-11-19 RX ADMIN — Medication 250 MILLIGRAM(S): at 17:19

## 2017-11-19 RX ADMIN — ONDANSETRON 4 MILLIGRAM(S): 8 TABLET, FILM COATED ORAL at 04:23

## 2017-11-19 RX ADMIN — ONDANSETRON 4 MILLIGRAM(S): 8 TABLET, FILM COATED ORAL at 17:22

## 2017-11-19 RX ADMIN — Medication 200 MILLIGRAM(S): at 12:45

## 2017-11-19 RX ADMIN — Medication 250 MILLIGRAM(S): at 06:06

## 2017-11-19 RX ADMIN — OXYCODONE AND ACETAMINOPHEN 1 TABLET(S): 5; 325 TABLET ORAL at 04:23

## 2017-11-19 NOTE — CONSULT NOTE ADULT - SUBJECTIVE AND OBJECTIVE BOX
Patient is a 52y old  Female who presents with a chief complaint of rectal bleeding and abdominal pain which started yesterday.      HPI:  51 y/o female with PMH of CAD s/p 2 YUE to Lcx, HLD, DM (not on insulin), uterine fibroids, endometriosis, presents with complaints of rectal bleeding. Patient was discharged from [facility] Saturday morning after undergoing cath with 2 stents Friday. She was discharged on ASA 81mg and Plavix 75mg. Patient took her plavix at noon, and 2-3 hrs later felt nauseas and had 2 episodes of NBNB vomiting. She also felt a little constipated initially, after which she had 1 episode of loose stools with bright red blood. Associated with sharp suprapubic abdominal pain that is intermittent. Patient is not tolerating po at the moment and states she did not eat anything out of the ordinary today. While in the ED, patient had 2 episodes of bright red blood per rectum without stools, about 1 teaspoon in quantity. Patient states she is feeling a little lightheaded and dizzy. Denies chest pain, difficulty breathing, fever, chills, dysuria. She had a CT of the abdomen and pelvis yesterday that showed colitis involving the distal transverse and descending colon. She was initially constipated the began experiencing rectal bleeding and abdominal pain followed by bloody diarrhea. No previous episodes of hematochezia and she has had no previous colonoscopies.      REVIEW OF SYSTEMS:  Constitutional: No fever, weight loss or fatigue  ENMT:  No difficulty hearing, tinnitus, vertigo; No sinus or throat pain  Respiratory: No cough, wheezing, chills or hemoptysis  Cardiovascular: No chest pain, palpitations, dizziness or leg swelling  Gastrointestinal: As per HPI.  Skin: No itching, burning, rashes or lesions   Musculoskeletal: No joint pain or swelling; No muscle, back or extremity pain  Patient has no cardiopulmonary, peripheral vascular, musculoskeletal, dermatological, neurological, gynecological or psychological symptoms or complaints at this time    PAST MEDICAL & SURGICAL HISTORY:  Hyperlipidemia, unspecified hyperlipidemia type  Uterine leiomyoma, unspecified location  CAD (coronary artery disease)  DM (diabetes mellitus): tried nutritionist and diet change 2016   when  first  hgba1c  was elevated,  repeat is  11  to  f/u with md  Former smoker  H/O heart artery stent  Breast implant status: left breast implant  redone due to collapse of  original implant  History of tonsillectomy      FAMILY HISTORY:  Family history of atrial fibrillation (Father), no FH of first degree relatives with colon cancer or colon polyps.      SOCIAL HISTORY:  Smoking Status: [ ] Current, [ x] Former, [ ] Never  Pack Years: 15 or so. No ETOH or drug abuse history.    MEDICATIONS:  MEDICATIONS  (STANDING):  aspirin  chewable 81 milliGRAM(s) Oral daily  atorvastatin 20 milliGRAM(s) Oral at bedtime  ciprofloxacin   IVPB 400 milliGRAM(s) IV Intermittent every 12 hours  clopidogrel Tablet 75 milliGRAM(s) Oral daily  insulin lispro (HumaLOG) corrective regimen sliding scale   SubCutaneous Before meals and at bedtime  lisinopril 2.5 milliGRAM(s) Oral daily  metoprolol succinate ER 25 milliGRAM(s) Oral daily  metroNIDAZOLE  IVPB 500 milliGRAM(s) IV Intermittent every 8 hours  multiple electrolytes Injection Type 1 1000 milliLiter(s) (100 mL/Hr) IV Continuous <Continuous>  saccharomyces boulardii 250 milliGRAM(s) Oral two times a day    MEDICATIONS  (PRN):  acetaminophen   Tablet 650 milliGRAM(s) Oral every 6 hours PRN For Temp greater than 38 C (100.4 F)  acetaminophen   Tablet. 650 milliGRAM(s) Oral every 6 hours PRN Mild Pain (1 - 3)  ondansetron Injectable 4 milliGRAM(s) IV Push every 6 hours PRN Nausea and/or Vomiting  oxyCODONE    5 mG/acetaminophen 325 mG 1 Tablet(s) Oral every 4 hours PRN Moderate Pain (4 - 6)  oxyCODONE    5 mG/acetaminophen 325 mG 2 Tablet(s) Oral every 4 hours PRN Severe Pain (7 - 10)      Allergies    erythromycin (Unknown)    Intolerances        Vital Signs Last 24 Hrs  T(C): 37.1 (19 Nov 2017 02:12), Max: 37.1 (19 Nov 2017 02:12)  T(F): 98.7 (19 Nov 2017 02:12), Max: 98.7 (19 Nov 2017 02:12)  HR: 80 (19 Nov 2017 08:00) (80 - 92)  BP: 103/- (19 Nov 2017 08:00) (103/- - 142/84)  BP(mean): --  RR: 20 (19 Nov 2017 08:00) (16 - 20)  SpO2: 98% (19 Nov 2017 08:00) (97% - 98%)        PHYSICAL EXAM:    General: Well developed; well nourished; in no acute distress when seen.  HEENT: MMM, conjunctiva pink and sclera anicteric.  Lungs: Clear bilaterally  Cor: RRR S1, S2 only.  Ab: Soft, non-tender non-distended; Normal bowel sounds; No rebound or guarding or HSM.  LUIS: Empty rectal vault.  Extremities: Normal range of motion, No clubbing, cyanosis or edema  Neurological: Alert and oriented x3  Skin: Warm and dry. No obvious rash      LABS:                        11.6   12.6  )-----------( 311      ( 19 Nov 2017 05:15 )             34.3     11-19    139  |  101  |  7.0<L>  ----------------------------<  150<H>  3.6   |  20.0<L>  |  0.50    Ca    8.7      19 Nov 2017 05:15  Phos  4.2     11-19  Mg     1.8     11-19    TPro  7.6  /  Alb  4.0  /  TBili  0.6  /  DBili  x   /  AST  16  /  ALT  23  /  AlkPhos  75  11-18          RADIOLOGY & ADDITIONAL STUDIES:   CT results noted.

## 2017-11-19 NOTE — PROGRESS NOTE ADULT - SUBJECTIVE AND OBJECTIVE BOX
SUBJECTIVE    REVIEW OF SYSTEMS    General: Not in any pain	    Skin/Breast: No rash  	  ENMT: No visual problems, no sore throat	    Respiratory and Thorax: No cough, No CP, No SOB  	  Cardiovascular: No CP, No palpitations    Gastrointestinal: No Abd pain, No N/V/D    Musculoskeletal: No Joint pain, No back pain	    Neurological: No headache    Psychiatric: No anxiety      OBJECTIVE    Vital Signs Last 24 Hrs  T(C): 37.1 (11-19-17 @ 02:12), Max: 37.1 (11-19-17 @ 02:12)  T(F): 98.7 (11-19-17 @ 02:12), Max: 98.7 (11-19-17 @ 02:12)  HR: 80 (11-19-17 @ 08:00) (80 - 92)  BP: 103/- (11-19-17 @ 08:00) (103/- - 142/84)  BP(mean): --  RR: 20 (11-19-17 @ 08:00) (16 - 20)  SpO2: 98% (11-19-17 @ 08:00) (97% - 98%)    PHYSICAL EXAM:    Constitutional: Not in any distress    Eyes: No conjunctival injection    ENMT: No oral lesions    Neck: No nodes, no adenopathy    Back: Straight, no defects    Respiratory: clear b/l    Cardiovascular: RRR, no murmur    Gastrointestinal: soft, NT, ND    Extremities: No edema, no erythema    Neurological: no focal deficit    Skin: No rash      MEDICATIONS  (STANDING):  aspirin  chewable 81 milliGRAM(s) Oral daily  atorvastatin 20 milliGRAM(s) Oral at bedtime  ciprofloxacin   IVPB 400 milliGRAM(s) IV Intermittent every 12 hours  clopidogrel Tablet 75 milliGRAM(s) Oral daily  insulin lispro (HumaLOG) corrective regimen sliding scale   SubCutaneous Before meals and at bedtime  metroNIDAZOLE  IVPB 500 milliGRAM(s) IV Intermittent every 8 hours  multiple electrolytes Injection Type 1 1000 milliLiter(s) (100 mL/Hr) IV Continuous <Continuous>  saccharomyces boulardii 250 milliGRAM(s) Oral two times a day    MEDICATIONS  (PRN):  acetaminophen   Tablet 650 milliGRAM(s) Oral every 6 hours PRN For Temp greater than 38 C (100.4 F)  acetaminophen   Tablet. 650 milliGRAM(s) Oral every 6 hours PRN Mild Pain (1 - 3)  ondansetron Injectable 4 milliGRAM(s) IV Push every 6 hours PRN Nausea and/or Vomiting  oxyCODONE    5 mG/acetaminophen 325 mG 1 Tablet(s) Oral every 4 hours PRN Moderate Pain (4 - 6)  oxyCODONE    5 mG/acetaminophen 325 mG 2 Tablet(s) Oral every 4 hours PRN Severe Pain (7 - 10)                              11.6   12.6  )-----------( 311      ( 19 Nov 2017 05:15 )             34.3     19 Nov 2017 05:15    139    |  101    |  7.0    ----------------------------<  150    3.6     |  20.0   |  0.50     Ca    8.7        19 Nov 2017 05:15  Phos  4.2       19 Nov 2017 05:15  Mg     1.8       19 Nov 2017 05:15    TPro  7.6    /  Alb  4.0    /  TBili  0.6    /  DBili  x      /  AST  16     /  ALT  23     /  AlkPhos  75     18 Nov 2017 20:22    Allergies    erythromycin (Unknown)    Intolerances

## 2017-11-19 NOTE — ED ADULT NURSE REASSESSMENT NOTE - NS ED NURSE REASSESS COMMENT FT1
assumed pt care this am, pt ambulates well, chart and meds reviewed, pt refused this am insulin coverage. pt states not on insulin at home and will wait for afternoon results.

## 2017-11-19 NOTE — CONSULT NOTE ADULT - PROBLEM SELECTOR RECOMMENDATION 9
Likely secondary to ischemic colitis given location of colitis on CT scan which is c/w watershed area of colon and given her h/o occlusive CAD. Broad spectrum antibiotics, clear liquids, stool studies to r/o possible infectious etiology which is less likely than ischemic etiology. Eventual OPT colonoscopy in 2 to 3 months if OK with Cardiology. Repeat labs ordered for the AM.

## 2017-11-19 NOTE — ED ADULT NURSE REASSESSMENT NOTE - NS ED NURSE REASSESS COMMENT FT1
pt continues to rest in bed, ambulates well with minimal assist. Tolerates clear liquid diet. wait ready bed. meds tolerated well.

## 2017-11-20 LAB
ANION GAP SERPL CALC-SCNC: 17 MMOL/L — SIGNIFICANT CHANGE UP (ref 5–17)
BASOPHILS # BLD AUTO: 0 K/UL — SIGNIFICANT CHANGE UP (ref 0–0.2)
BASOPHILS NFR BLD AUTO: 0.3 % — SIGNIFICANT CHANGE UP (ref 0–2)
BUN SERPL-MCNC: 6 MG/DL — LOW (ref 8–20)
CALCIUM SERPL-MCNC: 8.8 MG/DL — SIGNIFICANT CHANGE UP (ref 8.6–10.2)
CHLORIDE SERPL-SCNC: 100 MMOL/L — SIGNIFICANT CHANGE UP (ref 98–107)
CO2 SERPL-SCNC: 21 MMOL/L — LOW (ref 22–29)
CREAT SERPL-MCNC: 0.62 MG/DL — SIGNIFICANT CHANGE UP (ref 0.5–1.3)
EOSINOPHIL # BLD AUTO: 0.3 K/UL — SIGNIFICANT CHANGE UP (ref 0–0.5)
EOSINOPHIL NFR BLD AUTO: 2.3 % — SIGNIFICANT CHANGE UP (ref 0–6)
GLUCOSE BLDC GLUCOMTR-MCNC: 111 MG/DL — HIGH (ref 70–99)
GLUCOSE BLDC GLUCOMTR-MCNC: 119 MG/DL — HIGH (ref 70–99)
GLUCOSE BLDC GLUCOMTR-MCNC: 133 MG/DL — HIGH (ref 70–99)
GLUCOSE BLDC GLUCOMTR-MCNC: 139 MG/DL — HIGH (ref 70–99)
GLUCOSE SERPL-MCNC: 125 MG/DL — HIGH (ref 70–115)
HCT VFR BLD CALC: 34.7 % — LOW (ref 37–47)
HCT VFR BLD CALC: 35 % — LOW (ref 37–47)
HGB BLD-MCNC: 10.9 G/DL — LOW (ref 12–16)
HGB BLD-MCNC: 11.3 G/DL — LOW (ref 12–16)
LYMPHOCYTES # BLD AUTO: 25.2 % — SIGNIFICANT CHANGE UP (ref 20–55)
LYMPHOCYTES # BLD AUTO: 3.7 K/UL — SIGNIFICANT CHANGE UP (ref 1–4.8)
MCHC RBC-ENTMCNC: 26.5 PG — LOW (ref 27–31)
MCHC RBC-ENTMCNC: 27.1 PG — SIGNIFICANT CHANGE UP (ref 27–31)
MCHC RBC-ENTMCNC: 31.1 G/DL — LOW (ref 32–36)
MCHC RBC-ENTMCNC: 32.6 G/DL — SIGNIFICANT CHANGE UP (ref 32–36)
MCV RBC AUTO: 83.2 FL — SIGNIFICANT CHANGE UP (ref 81–99)
MCV RBC AUTO: 85.2 FL — SIGNIFICANT CHANGE UP (ref 81–99)
MONOCYTES # BLD AUTO: 1.2 K/UL — HIGH (ref 0–0.8)
MONOCYTES NFR BLD AUTO: 8 % — SIGNIFICANT CHANGE UP (ref 3–10)
NEUTROPHILS # BLD AUTO: 9.3 K/UL — HIGH (ref 1.8–8)
NEUTROPHILS NFR BLD AUTO: 63.9 % — SIGNIFICANT CHANGE UP (ref 37–73)
PLATELET # BLD AUTO: 285 K/UL — SIGNIFICANT CHANGE UP (ref 150–400)
PLATELET # BLD AUTO: 302 K/UL — SIGNIFICANT CHANGE UP (ref 150–400)
POTASSIUM SERPL-MCNC: 3.8 MMOL/L — SIGNIFICANT CHANGE UP (ref 3.5–5.3)
POTASSIUM SERPL-SCNC: 3.8 MMOL/L — SIGNIFICANT CHANGE UP (ref 3.5–5.3)
RBC # BLD: 4.11 M/UL — LOW (ref 4.4–5.2)
RBC # BLD: 4.17 M/UL — LOW (ref 4.4–5.2)
RBC # FLD: 14.7 % — SIGNIFICANT CHANGE UP (ref 11–15.6)
RBC # FLD: 14.8 % — SIGNIFICANT CHANGE UP (ref 11–15.6)
SODIUM SERPL-SCNC: 138 MMOL/L — SIGNIFICANT CHANGE UP (ref 135–145)
WBC # BLD: 13.3 K/UL — HIGH (ref 4.8–10.8)
WBC # BLD: 14.6 K/UL — HIGH (ref 4.8–10.8)
WBC # FLD AUTO: 13.3 K/UL — HIGH (ref 4.8–10.8)
WBC # FLD AUTO: 14.6 K/UL — HIGH (ref 4.8–10.8)

## 2017-11-20 PROCEDURE — 99233 SBSQ HOSP IP/OBS HIGH 50: CPT

## 2017-11-20 RX ADMIN — Medication 100 MILLIGRAM(S): at 11:28

## 2017-11-20 RX ADMIN — Medication 250 MILLIGRAM(S): at 06:15

## 2017-11-20 RX ADMIN — CLOPIDOGREL BISULFATE 75 MILLIGRAM(S): 75 TABLET, FILM COATED ORAL at 11:31

## 2017-11-20 RX ADMIN — Medication 100 MILLIGRAM(S): at 02:34

## 2017-11-20 RX ADMIN — SODIUM CHLORIDE 100 MILLILITER(S): 9 INJECTION, SOLUTION INTRAVENOUS at 07:55

## 2017-11-20 RX ADMIN — Medication 200 MILLIGRAM(S): at 01:09

## 2017-11-20 RX ADMIN — Medication 200 MILLIGRAM(S): at 14:49

## 2017-11-20 RX ADMIN — ATORVASTATIN CALCIUM 20 MILLIGRAM(S): 80 TABLET, FILM COATED ORAL at 21:47

## 2017-11-20 RX ADMIN — Medication 100 MILLIGRAM(S): at 17:38

## 2017-11-20 RX ADMIN — Medication 81 MILLIGRAM(S): at 01:09

## 2017-11-20 RX ADMIN — ONDANSETRON 4 MILLIGRAM(S): 8 TABLET, FILM COATED ORAL at 11:33

## 2017-11-20 RX ADMIN — Medication 250 MILLIGRAM(S): at 17:38

## 2017-11-20 RX ADMIN — Medication 81 MILLIGRAM(S): at 11:31

## 2017-11-20 NOTE — PROGRESS NOTE ADULT - ASSESSMENT
Likely bout of ischemic colitis due to low BP post procedure.  Ruling out bacterial colitis.  C Diff is negative.    Will try advancement of diet to Cardiac and low residue.  Alleges no DM but glucoses are running high.

## 2017-11-20 NOTE — PROGRESS NOTE ADULT - SUBJECTIVE AND OBJECTIVE BOX
Patient seen and examined.  Stable.  On clear liquid diet and tolerates well.  Had a tiny BM yesterday with blood.  Feeling better.  Less abdominal pain. No fever.  No distention.  No other BM's since ER.  Stool for C Diff is negative.  Requesting diet advancement.      PAST MEDICAL & SURGICAL HISTORY:  Hyperlipidemia, unspecified hyperlipidemia type  Uterine leiomyoma, unspecified location  CAD (coronary artery disease)  DM (diabetes mellitus): tried nutritionist and diet change 2016   when  first  hgba1c  was elevated,  repeat is  11  to  f/u with md  Former smoker  H/O heart artery stent  Breast implant status: left breast implant  redone due to collapse of  original implant  History of tonsillectomy      ROS:  No Heartburn, regurgitation, dysphagia, odynophagia.  No dyspepsia  No abdominal pain.    No Nausea, vomiting.  No Bleeding.  No hematemesis.   No diarrhea.    No hematochesia.  No weight loss, anorexia.  No edema.      MEDICATIONS  (STANDING):  aspirin  chewable 81 milliGRAM(s) Oral daily  atorvastatin 20 milliGRAM(s) Oral at bedtime  ciprofloxacin   IVPB 400 milliGRAM(s) IV Intermittent every 12 hours  clopidogrel Tablet 75 milliGRAM(s) Oral daily  influenza   Vaccine 0.5 milliLiter(s) IntraMuscular once  insulin lispro (HumaLOG) corrective regimen sliding scale   SubCutaneous Before meals and at bedtime  metroNIDAZOLE  IVPB 500 milliGRAM(s) IV Intermittent every 8 hours  multiple electrolytes Injection Type 1 1000 milliLiter(s) (100 mL/Hr) IV Continuous <Continuous>  saccharomyces boulardii 250 milliGRAM(s) Oral two times a day    MEDICATIONS  (PRN):  acetaminophen   Tablet 650 milliGRAM(s) Oral every 6 hours PRN For Temp greater than 38 C (100.4 F)  acetaminophen   Tablet. 650 milliGRAM(s) Oral every 6 hours PRN Mild Pain (1 - 3)  ondansetron Injectable 4 milliGRAM(s) IV Push every 6 hours PRN Nausea and/or Vomiting  oxyCODONE    5 mG/acetaminophen 325 mG 1 Tablet(s) Oral every 4 hours PRN Moderate Pain (4 - 6)  oxyCODONE    5 mG/acetaminophen 325 mG 2 Tablet(s) Oral every 4 hours PRN Severe Pain (7 - 10)      Allergies    erythromycin (Unknown)    Intolerances        Vital Signs Last 24 Hrs  T(C): 36.5 (2017 07:51), Max: 37.1 (2017 23:45)  T(F): 97.7 (2017 07:51), Max: 98.7 (2017 23:45)  HR: 86 (2017 07:51) (70 - 86)  BP: 109/67 (2017 07:51) (100/70 - 120/63)  BP(mean): --  RR: 18 (2017 07:51) (18 - 20)  SpO2: 99% (2017 07:51) (94% - 99%)    PHYSICAL EXAM:    GENERAL: NAD, well-groomed, well-developed  HEAD:  Atraumatic, Normocephalic  EYES: EOMI, PERRLA, conjunctiva and sclera clear  ENMT: No tonsillar erythema, exudates, or enlargement; Moist mucous membranes, Good dentition, No lesions  NECK: Supple, No JVD, Normal thyroid  CHEST/LUNG: Clear to percussion bilaterally; No rales, rhonchi, wheezing, or rubs  HEART: Regular rate and rhythm; No murmurs, rubs, or gallops  ABDOMEN: Soft, Nontender, Nondistended; Bowel sounds present.  No HSM , No MR.  Minor LUQ tenderness.  No guarding.    EXTREMITIES:  2+ Peripheral Pulses, No clubbing, cyanosis, or edema  LYMPH: No lymphadenopathy noted  SKIN: No rashes or lesions      LABS:                        11.3   14.6  )-----------( 302      ( 2017 07:03 )             34.7     11-20    138  |  100  |  6.0<L>  ----------------------------<  125<H>  3.8   |  21.0<L>  |  0.62    Ca    8.8      2017 07:03  Phos  4.2     11-19  Mg     1.8     11-19    TPro  7.6  /  Alb  4.0  /  TBili  0.6  /  DBili  x   /  AST  16  /  ALT  23  /  AlkPhos  75  11-18    PT/INR - ( 2017 20:22 )   PT: 11.9 sec;   INR: 1.08 ratio         PTT - ( 2017 20:22 )  PTT:27.9 sec   Urinalysis Basic - ( 2017 20:53 )    Color: Yellow / Appearance: Clear / S.020 / pH: x  Gluc: x / Ketone: Moderate  / Bili: Negative / Urobili: Negative mg/dL   Blood: x / Protein: 15 mg/dL / Nitrite: Negative   Leuk Esterase: Negative / RBC: Negative /HPF / WBC Negative   Sq Epi: x / Non Sq Epi: Occasional / Bacteria: x          RADIOLOGY & ADDITIONAL STUDIES:  CT A/P:  Colitis in Distal Tr colon; Spl Flex and Desc colon.  No perf or ascites.  HM, 22 cm.  Mild fatty liver.  Fibroid uterus and cystic ovaries.

## 2017-11-20 NOTE — CHART NOTE - NSCHARTNOTEFT_GEN_A_CORE
I was called to see this patient who sustained small infiltrate from IV right mid-arm. Patient very anxious regarding same. on inspection there is barely any noticeable swelling of area. minimal redness at site. no leakage from right extremity. No redness, warmth, or tenderness. No pain to touch.  Patient reassured that this is a minor event and will resolve completely.  Plan: warm compresses to right arm IV infiltrate site BID X 15 mins PRN.

## 2017-11-20 NOTE — PROGRESS NOTE ADULT - SUBJECTIVE AND OBJECTIVE BOX
SUBJECTIVE    REVIEW OF SYSTEMS    General: Not in any pain	    Skin/Breast: No rash  	  ENMT: No visual problems, no sore throat	    Respiratory and Thorax: No cough, No CP, + occassional SOB but not as bad as prior to angioplasty on Thurs  	  Cardiovascular: No CP, No palpitations    Gastrointestinal: No Abd pain, No N/V/D    Musculoskeletal: No Joint pain, No back pain	    Neurological: No headache    Psychiatric: No anxiety      OBJECTIVE    Vital Signs Last 24 Hrs  T(C): 37 (11-20-17 @ 20:30), Max: 37.1 (11-19-17 @ 23:45)  T(F): 98.6 (11-20-17 @ 20:30), Max: 98.7 (11-19-17 @ 23:45)  HR: 67 (11-20-17 @ 20:30) (67 - 86)  BP: 97/57 (11-20-17 @ 20:30) (97/57 - 116/69)  BP(mean): --  RR: 18 (11-20-17 @ 20:30) (17 - 18)  SpO2: 97% (11-20-17 @ 20:30) (94% - 99%)    PHYSICAL EXAM:    Constitutional: Not in any distress    Eyes: No conjunctival injection    ENMT: No oral lesions    Neck: No nodes, no adenopathy    Back: Straight, no defects    Respiratory: clear b/l    Cardiovascular: RRR, no murmur    Gastrointestinal: soft, NT, ND    Extremities: No edema, no erythema    Neurological: no focal deficit    Skin: No rash      MEDICATIONS  (STANDING):  aspirin  chewable 81 milliGRAM(s) Oral daily  atorvastatin 20 milliGRAM(s) Oral at bedtime  ciprofloxacin   IVPB 400 milliGRAM(s) IV Intermittent every 12 hours  clopidogrel Tablet 75 milliGRAM(s) Oral daily  influenza   Vaccine 0.5 milliLiter(s) IntraMuscular once  insulin lispro (HumaLOG) corrective regimen sliding scale   SubCutaneous Before meals and at bedtime  metroNIDAZOLE  IVPB 500 milliGRAM(s) IV Intermittent every 8 hours  saccharomyces boulardii 250 milliGRAM(s) Oral two times a day    MEDICATIONS  (PRN):  acetaminophen   Tablet 650 milliGRAM(s) Oral every 6 hours PRN For Temp greater than 38 C (100.4 F)  acetaminophen   Tablet. 650 milliGRAM(s) Oral every 6 hours PRN Mild Pain (1 - 3)  ondansetron Injectable 4 milliGRAM(s) IV Push every 6 hours PRN Nausea and/or Vomiting  oxyCODONE    5 mG/acetaminophen 325 mG 1 Tablet(s) Oral every 4 hours PRN Moderate Pain (4 - 6)  oxyCODONE    5 mG/acetaminophen 325 mG 2 Tablet(s) Oral every 4 hours PRN Severe Pain (7 - 10)                              10.9   13.3  )-----------( 285      ( 20 Nov 2017 10:01 )             35.0     20 Nov 2017 07:03    138    |  100    |  6.0    ----------------------------<  125    3.8     |  21.0   |  0.62     Ca    8.8        20 Nov 2017 07:03  Phos  4.2       19 Nov 2017 05:15  Mg     1.8       19 Nov 2017 05:15      Allergies    erythromycin (Unknown)    Intolerances

## 2017-11-21 PROBLEM — Z00.00 ENCOUNTER FOR PREVENTIVE HEALTH EXAMINATION: Status: ACTIVE | Noted: 2017-11-21

## 2017-11-21 LAB
GLUCOSE BLDC GLUCOMTR-MCNC: 114 MG/DL — HIGH (ref 70–99)
GLUCOSE BLDC GLUCOMTR-MCNC: 116 MG/DL — HIGH (ref 70–99)
GLUCOSE BLDC GLUCOMTR-MCNC: 121 MG/DL — HIGH (ref 70–99)
GLUCOSE BLDC GLUCOMTR-MCNC: 127 MG/DL — HIGH (ref 70–99)
HCT VFR BLD CALC: 35.8 % — LOW (ref 37–47)
HGB BLD-MCNC: 11.5 G/DL — LOW (ref 12–16)
MCHC RBC-ENTMCNC: 27.1 PG — SIGNIFICANT CHANGE UP (ref 27–31)
MCHC RBC-ENTMCNC: 32.1 G/DL — SIGNIFICANT CHANGE UP (ref 32–36)
MCV RBC AUTO: 84.2 FL — SIGNIFICANT CHANGE UP (ref 81–99)
PLATELET # BLD AUTO: 271 K/UL — SIGNIFICANT CHANGE UP (ref 150–400)
RBC # BLD: 4.25 M/UL — LOW (ref 4.4–5.2)
RBC # FLD: 14.8 % — SIGNIFICANT CHANGE UP (ref 11–15.6)
WBC # BLD: 12 K/UL — HIGH (ref 4.8–10.8)
WBC # FLD AUTO: 12 K/UL — HIGH (ref 4.8–10.8)

## 2017-11-21 PROCEDURE — 93010 ELECTROCARDIOGRAM REPORT: CPT

## 2017-11-21 PROCEDURE — 99233 SBSQ HOSP IP/OBS HIGH 50: CPT

## 2017-11-21 RX ADMIN — CLOPIDOGREL BISULFATE 75 MILLIGRAM(S): 75 TABLET, FILM COATED ORAL at 14:27

## 2017-11-21 RX ADMIN — Medication 100 MILLIGRAM(S): at 12:44

## 2017-11-21 RX ADMIN — Medication 250 MILLIGRAM(S): at 17:27

## 2017-11-21 RX ADMIN — Medication 81 MILLIGRAM(S): at 12:51

## 2017-11-21 RX ADMIN — Medication 200 MILLIGRAM(S): at 01:57

## 2017-11-21 RX ADMIN — Medication 100 MILLIGRAM(S): at 01:57

## 2017-11-21 RX ADMIN — ATORVASTATIN CALCIUM 20 MILLIGRAM(S): 80 TABLET, FILM COATED ORAL at 21:35

## 2017-11-21 RX ADMIN — Medication 100 MILLIGRAM(S): at 21:36

## 2017-11-21 RX ADMIN — Medication 250 MILLIGRAM(S): at 06:07

## 2017-11-21 RX ADMIN — Medication 200 MILLIGRAM(S): at 14:27

## 2017-11-21 NOTE — PROGRESS NOTE ADULT - ASSESSMENT
Patient with recent cardiac stenting and hypotension with segmental colitis and blood per rectum. Most likely ischemic colitis    1. Continue antibiotics.   2. Send stool culture  3. Can have colonoscopy in 2-3 months as outpatient.

## 2017-11-21 NOTE — CONSULT NOTE ADULT - SUBJECTIVE AND OBJECTIVE BOX
[facility], [facility]                                   [address] [place] [address]                                                      PHONE: [phone]                                                         FAX: [phone]  [email or web address]  ---------------------------------------------------------------------------------------------------------------------------------    [facility]: [facility]havya  HPI:  TESSIE RAND is an 52y Female PMHx HLD, DM, past tobacco use, CAD sp recent YUE to LCx with residual 90% stenosis of D2 and now with abdominal pain, nausea, vomiting and BRBPR which was mild.  The patient notes improvement in her SOB but not complete resolution.    PAST MEDICAL & SURGICAL HISTORY:  Hyperlipidemia, unspecified hyperlipidemia type  Uterine leiomyoma, unspecified location  CAD (coronary artery disease)  DM (diabetes mellitus): tried nutritionist and diet change 2016   when  first  hgba1c  was elevated,  repeat is  11  to  f/u with md  Former smoker  H/O heart artery stent  Breast implant status: left breast implant  redone due to collapse of  original implant  History of tonsillectomy      erythromycin (Unknown)      MEDICATIONS  (STANDING):  aspirin  chewable 81 milliGRAM(s) Oral daily  atorvastatin 20 milliGRAM(s) Oral at bedtime  ciprofloxacin   IVPB 400 milliGRAM(s) IV Intermittent every 12 hours  clopidogrel Tablet 75 milliGRAM(s) Oral daily  influenza   Vaccine 0.5 milliLiter(s) IntraMuscular once  insulin lispro (HumaLOG) corrective regimen sliding scale   SubCutaneous Before meals and at bedtime  metroNIDAZOLE  IVPB 500 milliGRAM(s) IV Intermittent every 8 hours  saccharomyces boulardii 250 milliGRAM(s) Oral two times a day    MEDICATIONS  (PRN):  acetaminophen   Tablet 650 milliGRAM(s) Oral every 6 hours PRN For Temp greater than 38 C (100.4 F)  acetaminophen   Tablet. 650 milliGRAM(s) Oral every 6 hours PRN Mild Pain (1 - 3)  ondansetron Injectable 4 milliGRAM(s) IV Push every 6 hours PRN Nausea and/or Vomiting  oxyCODONE    5 mG/acetaminophen 325 mG 1 Tablet(s) Oral every 4 hours PRN Moderate Pain (4 - 6)  oxyCODONE    5 mG/acetaminophen 325 mG 2 Tablet(s) Oral every 4 hours PRN Severe Pain (7 - 10)      Social History:  Cigarettes:     past               Alchohol:         no        Illicit Drug Abuse:  no  FHX nc  ROS: Negative other than as mentioned in HPI.    Vital Signs Last 24 Hrs  T(C): 36.2 (21 Nov 2017 06:16), Max: 37 (20 Nov 2017 20:30)  T(F): 97.1 (21 Nov 2017 06:16), Max: 98.6 (20 Nov 2017 20:30)  HR: 75 (21 Nov 2017 06:16) (67 - 79)  BP: 100/70 (21 Nov 2017 08:20) (97/53 - 108/65)  BP(mean): --  RR: 20 (21 Nov 2017 06:16) (17 - 20)  SpO2: 99% (21 Nov 2017 06:16) (94% - 99%)  ICU Vital Signs Last 24 Hrs  TESSIE RAND  I&O's Detail    20 Nov 2017 07:01  -  21 Nov 2017 07:00  --------------------------------------------------------  IN:    multiple electrolytes Injection Type 1multiple electrolytes Injection Type 1: 600 mL    Oral Fluid: 160 mL    Solution: 200 mL    Solution: 200 mL  Total IN: 1160 mL    OUT:  Total OUT: 0 mL    Total NET: 1160 mL        I&O's Summary    20 Nov 2017 07:01  -  21 Nov 2017 07:00  --------------------------------------------------------  IN: 1160 mL / OUT: 0 mL / NET: 1160 mL      Drug Dosing Weight  TESSIE RAND      PHYSICAL EXAM:  General: Appears well developed, well nourished alert and cooperative.  HEENT: Head; normocephalic, atraumatic.  Eyes: Pupils reactive, cornea wnl.  Neck: Supple, no nodes adenopathy, no NVD or carotid bruit or thyromegaly.  CARDIOVASCULAR: Normal S1 and S2, No murmur, rub, gallop or lift.   LUNGS: No rales, rhonchi or wheeze. Normal breath sounds bilaterally.  ABDOMEN: Soft, nontender without mass or organomegaly. bowel sounds normoactive.  EXTREMITIES: No clubbing, cyanosis or edema. Distal pulses wnl.   SKIN: warm and dry with normal turgor.  NEURO: Alert/oriented x 3/normal motor exam. No pathologic reflexes.    PSYCH: normal affect.        LABS:                        11.5   12.0  )-----------( 271      ( 21 Nov 2017 07:17 )             35.8     11-20    138  |  100  |  6.0<L>  ----------------------------<  125<H>  3.8   |  21.0<L>  |  0.62    Ca    8.8      20 Nov 2017 07:03      TESSIE ECHOLSNOAHNEAL            RADIOLOGY & ADDITIONAL STUDIES:    INTERPRETATION OF TELEMETRY (personally reviewed): no events    ECG: pending    ECHO: < from: TTE Echo w/Cont Complete (11.17.17 @ 21:27) >   Summary:   1. The left atrium is normal in size.   2. Normal wall motion. Left ventricular ejection fraction, by visual   estimation, is 60 to 65%.   3. The right atrium is normal in size.   4. Normal right ventricular size and function.   5. No significant valvular abnormality.   6. There is no evidence of pericardial effusion.     , Electronically signed on 11/17/2017 at 10:59:12 PM    < end of copied text >         CARDIAC CATHETERIZATION: < from: Cardiac Cath Lab - Adult (11.17.17 @ 17:11) >  VENTRICLES: LVEF 55%  CORONARY VESSELS: The coronary circulation is right dominant.  LM:   --  LM: Normal.  LAD:   --  Proximal LAD: There was a 20 % stenosis.  --  D1: There was a 25 % stenosis.  --  D2: There was a 90 % stenosis.  CX:   --  Mid circumflex: There was a 95 % stenosis.  --  OM1: There was a 95 % stenosis.  RCA:   --  RCA: Angiography showedminor luminal irregularities with no  flow limiting lesions.  --  RPDA: There was a 25 % stenosis.  COMPLICATIONS: There were no complications. No complications occurred  during the cath lab visit.  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  The LCX was treated with PTCA and STENT (YUE-Syngergy) with IVUS.  DIAGNOSTIC RECOMMENDATIONS: ASA and Brilinta If persistent symptom then PCI  of Dia2  INTERVENTIONAL IMPRESSIONS: Severe mid LCX disease and dia2 disease  (small). The LCX was treated with PTCA and STENT (YUE-Syngergy) with IVUS.  INTERVENTIONAL RECOMMENDATIONS: ASA and Brilinta If persistent symptom then  PCI of Dia2  Prepared and signed by  Rajat Minaya MD  Signed 11/17/2017 18:23:21    < end of copied text >          Assessment and Plan:  In summary, TESSIE RAND is an 52y Female with past medical history significant for HLD, DM, past tobacco use, CAD sp recent YUE to LCx with residual 90% stenosis of D2 and now with abdominal pain, nausea, vomiting and BRBPR which was mild.  The patient notes improvement in her SOB but not complete resolution.    Colitis, recent PCI to LCx    1) cw asa, brilinta  2) Dyspnea maybe multifactorial and she will see pulm as outpt  3) Will also consider PCI to D2 as outpt   4) Check EKG  5) Appreciate GI following

## 2017-11-21 NOTE — PROGRESS NOTE ADULT - SUBJECTIVE AND OBJECTIVE BOX
SUBJECTIVE    REVIEW OF SYSTEMS    General: Not in any pain	    Skin/Breast: No rash  	  ENMT: No visual problems, no sore throat	    Respiratory and Thorax: No cough, No CP, No SOB  	  Cardiovascular: No CP, No palpitations    Gastrointestinal: No Abd pain, No N/V/D    Musculoskeletal: No Joint pain, No back pain	    Neurological: No headache    Psychiatric: No anxiety      OBJECTIVE    Vital Signs Last 24 Hrs  T(C): 36.2 (11-21-17 @ 06:16), Max: 37 (11-20-17 @ 20:30)  T(F): 97.1 (11-21-17 @ 06:16), Max: 98.6 (11-20-17 @ 20:30)  HR: 75 (11-21-17 @ 06:16) (67 - 79)  BP: 100/70 (11-21-17 @ 08:20) (97/53 - 108/65)  BP(mean): --  RR: 20 (11-21-17 @ 06:16) (17 - 20)  SpO2: 99% (11-21-17 @ 06:16) (94% - 99%)    PHYSICAL EXAM:    Constitutional: Not in any distress    Eyes: No conjunctival injection    ENMT: No oral lesions    Neck: No nodes, no adenopathy    Back: Straight, no defects    Respiratory: clear b/l    Cardiovascular: RRR, no murmur    Gastrointestinal: soft, NT, ND    Extremities: No edema, no erythema    Neurological: no focal deficit    Skin: No rash      MEDICATIONS  (STANDING):  aspirin  chewable 81 milliGRAM(s) Oral daily  atorvastatin 20 milliGRAM(s) Oral at bedtime  ciprofloxacin   IVPB 400 milliGRAM(s) IV Intermittent every 12 hours  clopidogrel Tablet 75 milliGRAM(s) Oral daily  influenza   Vaccine 0.5 milliLiter(s) IntraMuscular once  insulin lispro (HumaLOG) corrective regimen sliding scale   SubCutaneous Before meals and at bedtime  metroNIDAZOLE  IVPB 500 milliGRAM(s) IV Intermittent every 8 hours  saccharomyces boulardii 250 milliGRAM(s) Oral two times a day    MEDICATIONS  (PRN):  acetaminophen   Tablet 650 milliGRAM(s) Oral every 6 hours PRN For Temp greater than 38 C (100.4 F)  acetaminophen   Tablet. 650 milliGRAM(s) Oral every 6 hours PRN Mild Pain (1 - 3)  ondansetron Injectable 4 milliGRAM(s) IV Push every 6 hours PRN Nausea and/or Vomiting  oxyCODONE    5 mG/acetaminophen 325 mG 1 Tablet(s) Oral every 4 hours PRN Moderate Pain (4 - 6)  oxyCODONE    5 mG/acetaminophen 325 mG 2 Tablet(s) Oral every 4 hours PRN Severe Pain (7 - 10)                              11.5   12.0  )-----------( 271      ( 21 Nov 2017 07:17 )             35.8     20 Nov 2017 07:03    138    |  100    |  6.0    ----------------------------<  125    3.8     |  21.0   |  0.62     Ca    8.8        20 Nov 2017 07:03      Allergies    erythromycin (Unknown)    Intolerances

## 2017-11-21 NOTE — PROGRESS NOTE ADULT - SUBJECTIVE AND OBJECTIVE BOX
INTERVAL HPI/OVERNIGHT EVENTS: FU for colitis. Patient was still complaining of some dizziness. Noticing small amount of blood per rectum. C diff negative. No abdominal pain. No fever. No chills. No previous colonoscopy. 	    MEDICATIONS  (STANDING):  aspirin  chewable 81 milliGRAM(s) Oral daily  atorvastatin 20 milliGRAM(s) Oral at bedtime  ciprofloxacin   IVPB 400 milliGRAM(s) IV Intermittent every 12 hours  clopidogrel Tablet 75 milliGRAM(s) Oral daily  influenza   Vaccine 0.5 milliLiter(s) IntraMuscular once  insulin lispro (HumaLOG) corrective regimen sliding scale   SubCutaneous Before meals and at bedtime  metroNIDAZOLE  IVPB 500 milliGRAM(s) IV Intermittent every 8 hours  saccharomyces boulardii 250 milliGRAM(s) Oral two times a day    MEDICATIONS  (PRN):  acetaminophen   Tablet 650 milliGRAM(s) Oral every 6 hours PRN For Temp greater than 38 C (100.4 F)  acetaminophen   Tablet. 650 milliGRAM(s) Oral every 6 hours PRN Mild Pain (1 - 3)  ondansetron Injectable 4 milliGRAM(s) IV Push every 6 hours PRN Nausea and/or Vomiting  oxyCODONE    5 mG/acetaminophen 325 mG 1 Tablet(s) Oral every 4 hours PRN Moderate Pain (4 - 6)  oxyCODONE    5 mG/acetaminophen 325 mG 2 Tablet(s) Oral every 4 hours PRN Severe Pain (7 - 10)      Allergies    erythromycin (Unknown)    Intolerances        Vital Signs Last 24 Hrs  T(C): 36.2 (21 Nov 2017 06:16), Max: 37 (20 Nov 2017 20:30)  T(F): 97.1 (21 Nov 2017 06:16), Max: 98.6 (20 Nov 2017 20:30)  HR: 75 (21 Nov 2017 06:16) (67 - 79)  BP: 102/67 (21 Nov 2017 06:16) (97/53 - 108/65)  BP(mean): --  RR: 20 (21 Nov 2017 06:16) (17 - 20)  SpO2: 99% (21 Nov 2017 06:16) (94% - 99%)    LABS:                        11.5   12.0  )-----------( 271      ( 21 Nov 2017 07:17 )             35.8     11-20    138  |  100  |  6.0<L>  ----------------------------<  125<H>  3.8   |  21.0<L>  |  0.62    Ca    8.8      20 Nov 2017 07:03            RADIOLOGY & ADDITIONAL TESTS:  Clostridium difficile Toxin by PCR (11.19.17 @ 00:23)    Clostridium difficile Toxin by PCR: RESULT INTERPRETATION:    Not detected - No Clostridium difficile toxins detected by amplified DNA  PCR.    C. difficile PCR test results should be interpreted only with  consideration of the patient's clinical situation and history.  This test  willdetect the presence of toxigenic C. difficile.  However it cannot be  used as the sole criteria for the diagnosis of antibiotic associated  diarrhea, antibiotic associated colitis, or pseudomembranous colitis.  Colonization with C. difficile may exceed 20% in hospital patients, the  majority of whom are without Toxigenic Clostridium Difficile disease.  Testing is generally not recommended in children below the age of 1 year,  as up to half of healthy infants are asymptomatically colonized with C.  difficile.  In addition, C. difficile PCR testing cannot be used as a  "test of cure" as dead organism nucleic acids will persist and be  detected after treatment.  Successful treatment of C. difficile disease  is determined by resolution of clinical symptoms.    C Diff by PCR Result: NotDetec    Culture - Urine (11.18.17 @ 20:52)    Specimen Source: .Urine Clean Catch (Midstream)    Culture Results:   No growth

## 2017-11-22 DIAGNOSIS — R42 DIZZINESS AND GIDDINESS: ICD-10-CM

## 2017-11-22 DIAGNOSIS — F41.8 OTHER SPECIFIED ANXIETY DISORDERS: ICD-10-CM

## 2017-11-22 LAB
CULTURE RESULTS: SIGNIFICANT CHANGE UP
GLUCOSE BLDC GLUCOMTR-MCNC: 102 MG/DL — HIGH (ref 70–99)
GLUCOSE BLDC GLUCOMTR-MCNC: 123 MG/DL — HIGH (ref 70–99)
GLUCOSE BLDC GLUCOMTR-MCNC: 124 MG/DL — HIGH (ref 70–99)
GLUCOSE BLDC GLUCOMTR-MCNC: 126 MG/DL — HIGH (ref 70–99)
HCT VFR BLD CALC: 34.6 % — LOW (ref 37–47)
HGB BLD-MCNC: 11.1 G/DL — LOW (ref 12–16)
MCHC RBC-ENTMCNC: 26.6 PG — LOW (ref 27–31)
MCHC RBC-ENTMCNC: 32.1 G/DL — SIGNIFICANT CHANGE UP (ref 32–36)
MCV RBC AUTO: 83 FL — SIGNIFICANT CHANGE UP (ref 81–99)
PLATELET # BLD AUTO: 270 K/UL — SIGNIFICANT CHANGE UP (ref 150–400)
RBC # BLD: 4.17 M/UL — LOW (ref 4.4–5.2)
RBC # FLD: 14.6 % — SIGNIFICANT CHANGE UP (ref 11–15.6)
SPECIMEN SOURCE: SIGNIFICANT CHANGE UP
WBC # BLD: 9.4 K/UL — SIGNIFICANT CHANGE UP (ref 4.8–10.8)
WBC # FLD AUTO: 9.4 K/UL — SIGNIFICANT CHANGE UP (ref 4.8–10.8)

## 2017-11-22 PROCEDURE — 93010 ELECTROCARDIOGRAM REPORT: CPT

## 2017-11-22 PROCEDURE — 99232 SBSQ HOSP IP/OBS MODERATE 35: CPT

## 2017-11-22 PROCEDURE — 99233 SBSQ HOSP IP/OBS HIGH 50: CPT

## 2017-11-22 RX ADMIN — CLOPIDOGREL BISULFATE 75 MILLIGRAM(S): 75 TABLET, FILM COATED ORAL at 12:56

## 2017-11-22 RX ADMIN — Medication 100 MILLIGRAM(S): at 01:03

## 2017-11-22 RX ADMIN — Medication 250 MILLIGRAM(S): at 05:46

## 2017-11-22 RX ADMIN — Medication 250 MILLIGRAM(S): at 17:07

## 2017-11-22 RX ADMIN — ATORVASTATIN CALCIUM 20 MILLIGRAM(S): 80 TABLET, FILM COATED ORAL at 21:57

## 2017-11-22 RX ADMIN — Medication 81 MILLIGRAM(S): at 12:56

## 2017-11-22 RX ADMIN — Medication 200 MILLIGRAM(S): at 14:30

## 2017-11-22 RX ADMIN — Medication 200 MILLIGRAM(S): at 02:19

## 2017-11-22 NOTE — PROGRESS NOTE ADULT - SUBJECTIVE AND OBJECTIVE BOX
[facility], [facility]                                   [address] [address]                                                      PHONE: [phone]                                                         FAX: [phone]  ----------------------------------------------------------------------------------------------------    Pt seen and examined. FU for CAD    Overnight events/Complaints: Pt without complains. Tolerating po diet. No further active bleeding.    Vital Signs Last 24 Hrs  T(C): 36.6 (22 Nov 2017 07:40), Max: 36.6 (22 Nov 2017 07:40)  T(F): 97.9 (22 Nov 2017 07:40), Max: 97.9 (22 Nov 2017 07:40)  HR: 71 (22 Nov 2017 07:40) (71 - 80)  BP: 96/65 (22 Nov 2017 07:40) (96/65 - 101/63)  BP(mean): --  RR: 18 (22 Nov 2017 07:40) (18 - 20)  SpO2: 98% (22 Nov 2017 07:40) (95% - 98%)  I&O's Summary      PHYSICAL EXAM:  Constitutional: Oriented to time, place and person. Appears well developed, well nourished; alert and co-operative  HEENT:     Conjunctiva normal, Normal oral mucosa, No JVD	  Cardiovascular: Normal S1 S2, No murmurs  Respiratory: Lungs clear to auscultation; no crepitations, no wheeze  Gastrointestinal:  Soft, Non-tender, + BS	  Extremities: No cyanosis, clubbing or edema  Skin: Warm and dry  Neurologic: Alert oriented to time place and person  Psychiatric: affect was normal        LABS:                        11.1   9.4   )-----------( 270      ( 22 Nov 2017 07:11 )             34.6     RADIOLOGY & ADDITIONAL STUDIES:    CARDIOLOGY TESTING:     Telemetry: No arrhythmias    CHO: < from: TTE Echo w/Cont Complete (11.17.17 @ 21:27) >   Summary:   1. The left atrium is normal in size.   2. Normal wall motion. Left ventricular ejection fraction, by visual   estimation, is 60 to 65%.   3. The right atrium is normal in size.   4. Normal right ventricular size and function.   5. No significant valvular abnormality.   6. There is no evidence of pericardial effusion.     , Electronically signed on 11/17/2017 at 10:59:12 PM    < end of copied text >         CARDIAC CATHETERIZATION: < from: Cardiac Cath Lab - Adult (11.17.17 @ 17:11) >  VENTRICLES: LVEF 55%  CORONARY VESSELS: The coronary circulation is right dominant.  LM:   --  LM: Normal.  LAD:   --  Proximal LAD: There was a 20 % stenosis.  --  D1: There was a 25 % stenosis.  --  D2: There was a 90 % stenosis.  CX:   --  Mid circumflex: There was a 95 % stenosis.  --  OM1: There was a 95 % stenosis.  RCA:   --  RCA: Angiography showedminor luminal irregularities with no  flow limiting lesions.  --  RPDA: There was a 25 % stenosis.  COMPLICATIONS: There were no complications. No complications occurred  during the cath lab visit.  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  The LCX was treated with PTCA and STENT (YUE-Syngergy) with IVUS.  DIAGNOSTIC RECOMMENDATIONS: ASA and Brilinta If persistent symptom then PCI  of Dia2  INTERVENTIONAL IMPRESSIONS: Severe mid LCX disease and dia2 disease  (small). The LCX was treated with PTCA and STENT (YUE-Syngergy) with IVUS.  INTERVENTIONAL RECOMMENDATIONS: ASA and Brilinta If persistent symptom then  PCI of Dia2  Prepared and signed by  Rajat Minaya MD  Signed 11/17/2017 18:23:21    < end of copied text >      MEDICATIONS:  MEDICATIONS  (STANDING):  aspirin  chewable 81 milliGRAM(s) Oral daily  atorvastatin 20 milliGRAM(s) Oral at bedtime  ciprofloxacin   IVPB 400 milliGRAM(s) IV Intermittent every 12 hours  clopidogrel Tablet 75 milliGRAM(s) Oral daily  influenza   Vaccine 0.5 milliLiter(s) IntraMuscular once  insulin lispro (HumaLOG) corrective regimen sliding scale   SubCutaneous Before meals and at bedtime  saccharomyces boulardii 250 milliGRAM(s) Oral two times a day    MEDICATIONS  (PRN):  acetaminophen   Tablet 650 milliGRAM(s) Oral every 6 hours PRN For Temp greater than 38 C (100.4 F)  acetaminophen   Tablet. 650 milliGRAM(s) Oral every 6 hours PRN Mild Pain (1 - 3)  ondansetron Injectable 4 milliGRAM(s) IV Push every 6 hours PRN Nausea and/or Vomiting  oxyCODONE    5 mG/acetaminophen 325 mG 1 Tablet(s) Oral every 4 hours PRN Moderate Pain (4 - 6)  oxyCODONE    5 mG/acetaminophen 325 mG 2 Tablet(s) Oral every 4 hours PRN Severe Pain (7 - 10)      ASSESSMENT AND PLAN:    52y Female with past medical history significant for HLD, DM, past tobacco use, CAD sp recent YUE to LCx with residual 90% stenosis of D2 with abdominal pain, nausea, vomiting and BRBPR with ischemic colitis.    -  Continue ASA, Plavix and statin  -  Resume toprol XL later this week once po diet resumed to normal  -  Reinforced compliance with DAPT  -  Will arrange outpt FU in office in 1-2 weeks to reassess symptoms and possibly cardiac rehab.

## 2017-11-22 NOTE — PROGRESS NOTE ADULT - SUBJECTIVE AND OBJECTIVE BOX
Dr. Dinero Hospitalist Progress Note  TESSIE RAND [id number]    Patient is a 52y old  Female who presents with a chief complaint of rectal bleeding (18 Nov 2017 23:29). dizziness    HPI:  53 y/o female with PMH of CAD s/p 2 YUE to Lcx, HLD, DM (not on insulin), uterine fibroids, endometriosis, presents with complaints of rectal bleeding. Patient was discharged from [facility] this morning after undergoing cath with 2 stents. She was discharged on ASA 81mg and Plavix 75mg. Patient took her plavix at noon, and 2-3 hrs later felt nauseas and had 2 episodes of NBNB vomiting. She also felt a little constipated initially, after which she had 1 episode of loose stools with bright red blood. Associated with sharp suprapubic abdominal pain that is intermittent. Patient is not tolerating po at the moment and states she did not eat anything out of the ordinary today. While in the ED, patient had 2 episodes of bright red blood per rectum without stools, about 1 teaspoon in quantity. Patient states she is feeling a little lightheaded and dizzy. Denies chest pain, difficulty breathing, fever, chills, dysuria. (18 Nov 2017 23:29)    seen at bedside today. BM twice yesterday, no Bm today. no blood in stool in since last week. Gi following. was on dual abx. flagyl was dced today by GI. no abd pain. mild nausea. no vomitting. no hematchezia or melena. no dysuria/flank pain/hematuria/frequency/urgeny.     complaining of dizzines/lightheadedness. she felt lightheaded last week when her BP dropped. BP lower side. worried BP dropped again or something in the heart. no chest pain      T(C): 36.5 (11-22-17 @ 00:34), Max: 36.5 (11-21-17 @ 15:30)  HR: 79 (11-22-17 @ 00:34) (79 - 80)  BP: 101/63 (11-22-17 @ 00:34) (100/59 - 101/63)  RR: 18 (11-22-17 @ 00:34) (18 - 20)  SpO2: 95% (11-22-17 @ 00:34) (95% - 97%)  CAPILLARY BLOOD GLUCOSE      POCT Blood Glucose.: 123 mg/dL (22 Nov 2017 07:41)  POCT Blood Glucose.: 116 mg/dL (21 Nov 2017 21:48)  POCT Blood Glucose.: 127 mg/dL (21 Nov 2017 17:27)  POCT Blood Glucose.: 121 mg/dL (21 Nov 2017 12:41)                          11.1   9.4   )-----------( 270      ( 22 Nov 2017 07:11 )             34.6            MEDICATIONS  (STANDING):  aspirin  chewable 81 milliGRAM(s) Oral daily  atorvastatin 20 milliGRAM(s) Oral at bedtime  ciprofloxacin   IVPB 400 milliGRAM(s) IV Intermittent every 12 hours  clopidogrel Tablet 75 milliGRAM(s) Oral daily  influenza   Vaccine 0.5 milliLiter(s) IntraMuscular once  insulin lispro (HumaLOG) corrective regimen sliding scale   SubCutaneous Before meals and at bedtime  metroNIDAZOLE  IVPB 500 milliGRAM(s) IV Intermittent every 8 hours  saccharomyces boulardii 250 milliGRAM(s) Oral two times a day    MEDICATIONS  (PRN):  acetaminophen   Tablet 650 milliGRAM(s) Oral every 6 hours PRN For Temp greater than 38 C (100.4 F)  acetaminophen   Tablet. 650 milliGRAM(s) Oral every 6 hours PRN Mild Pain (1 - 3)  ondansetron Injectable 4 milliGRAM(s) IV Push every 6 hours PRN Nausea and/or Vomiting  oxyCODONE    5 mG/acetaminophen 325 mG 1 Tablet(s) Oral every 4 hours PRN Moderate Pain (4 - 6)  oxyCODONE    5 mG/acetaminophen 325 mG 2 Tablet(s) Oral every 4 hours PRN Severe Pain (7 - 10) Dr. Dinero Hospitalist Progress Note  [facility] [id number]    patient of . distributed to me today.      Patient is a 52y old  Female who presents with a chief complaint of rectal bleeding (18 Nov 2017 23:29). currently being f/u for ischaemic colitis. dizziness    HPI:    seen at bedside today. BM twice yesterday, no Bm today. no blood in stool in since last week. Gi following. was on dual abx. flagyl was dced today by GI. no abd pain. mild nausea. no vomitting. no hematchezia or melena. no dysuria/flank pain/hematuria/frequency/urgeny.     complaining of dizziness/lightheadedness. she felt lightheaded last week when her BP dropped. BP lower side. worried BP dropped again or something in the heart. no chest pain. SOB+ same . no palpitation/leg edema. no weakness or numbness. no speech or vision problem. no depresson. anxiety+. no Si        ROS:  ROS neg except described above    P/E:      T(C): 36.5 (11-22-17 @ 00:34), Max: 36.5 (11-21-17 @ 15:30)  HR: 79 (11-22-17 @ 00:34) (79 - 80)  BP: 101/63 (11-22-17 @ 00:34) (100/59 - 101/63)  RR: 18 (11-22-17 @ 00:34) (18 - 20)  SpO2: 95% (11-22-17 @ 00:34) (95% - 97%)  CAPILLARY BLOOD GLUCOSE    GENERAL: Not in distress. Alert  very anxious about current conditions. started   HEENT:  Normocephalic and atraumatic. PEARLA,EOMI  NECK: Supple.  No JVD.    CARDIOVASCULAR: RRR S1, S2. No murmur/rubs/gallop  LUNGS: BLAE+, no rales, no wheezing, no rhonchi.    ABDOMEN: ND. Soft, NT, no guarding / rebound / rigidity. BS normoactive. No CVA tenderness.    BACK: No spine tenderness.  EXTREMITIES: no cyanosis, no clubbing, no edema.   SKIN: no rash. No skin break or ulcer. No cellulitis.  NEUROLOGIC: AAO*3.strength is symmetric, sensation intact, speech fluent.    PSYCHIATRIC: Calm.  No agitation. anxious. not depressed.         POCT Blood Glucose.: 123 mg/dL (22 Nov 2017 07:41)  POCT Blood Glucose.: 116 mg/dL (21 Nov 2017 21:48)  POCT Blood Glucose.: 127 mg/dL (21 Nov 2017 17:27)  POCT Blood Glucose.: 121 mg/dL (21 Nov 2017 12:41)                          11.1   9.4   )-----------( 270      ( 22 Nov 2017 07:11 )             34.6            MEDICATIONS  (STANDING):  aspirin  chewable 81 milliGRAM(s) Oral daily  atorvastatin 20 milliGRAM(s) Oral at bedtime  ciprofloxacin   IVPB 400 milliGRAM(s) IV Intermittent every 12 hours  clopidogrel Tablet 75 milliGRAM(s) Oral daily  influenza   Vaccine 0.5 milliLiter(s) IntraMuscular once  insulin lispro (HumaLOG) corrective regimen sliding scale   SubCutaneous Before meals and at bedtime  saccharomyces boulardii 250 milliGRAM(s) Oral two times a day    MEDICATIONS  (PRN):  acetaminophen   Tablet 650 milliGRAM(s) Oral every 6 hours PRN For Temp greater than 38 C (100.4 F)  acetaminophen   Tablet. 650 milliGRAM(s) Oral every 6 hours PRN Mild Pain (1 - 3)  ondansetron Injectable 4 milliGRAM(s) IV Push every 6 hours PRN Nausea and/or Vomiting  oxyCODONE    5 mG/acetaminophen 325 mG 1 Tablet(s) Oral every 4 hours PRN Moderate Pain (4 - 6)  oxyCODONE    5 mG/acetaminophen 325 mG 2 Tablet(s) Oral every 4 hours PRN Severe Pain (7 - 10)    stool cx pending    < from: TTE Echo w/Cont Complete (11.17.17 @ 21:27) >  Summary:   1. The left atrium is normal in size.   2. Normal wall motion. Left ventricular ejection fraction, by visual   estimation, is 60 to 65%.   3. The right atrium is normal in size.   4. Normal right ventricular size and function.   5. No significant valvular abnormality.   6. There is no evidence of pericardial effusion.    < end of copied text >

## 2017-11-22 NOTE — PROGRESS NOTE ADULT - PROBLEM SELECTOR PLAN 4
check orthostasis, EKG stat showed non-specific St-T wave changes.@74 bpm. no significant difference from previous. possibly related to anxiety. however r/o organic causes. fall precaution. f/u cardio

## 2017-11-22 NOTE — PROGRESS NOTE ADULT - PROBLEM SELECTOR PLAN 3
cont asa, plavix, lipitor; hold toprol, lisinopril secondary to hypotension and intolerability per pt
pt has episodes of dyspnea, cardio eval appreciated.
cont plavix and asa; will ask cardio to stop by as pt still has some dyspnea
pt has episodes of dyspnea, will ask for cardio eval as pt had 2 stents placed last week

## 2017-11-22 NOTE — PROGRESS NOTE ADULT - ASSESSMENT
Patient with ischemic colitis.    1. Discontinue flagyl  2. Follow up with Dr Mendoza after discharge  3. Continue aspirin and plavix Patient with ischemic colitis. Still complains of dizziness    1. Discontinue flagyl. Complete five days of antibiotics.   2. Follow up with Dr Mendoza after discharge  3. Continue aspirin and plavix

## 2017-11-22 NOTE — PROGRESS NOTE ADULT - PROBLEM SELECTOR PLAN 5
denied h/o anxiety/depression. educated about relaxation technique. cauurent condition and prognosis and plan of care informed.

## 2017-11-22 NOTE — PROGRESS NOTE ADULT - SUBJECTIVE AND OBJECTIVE BOX
INTERVAL HPI/OVERNIGHT EVENTS:FU for abdominal pain, bloody diarrhea and segmental colitis after cardiac cath and hypotension episodes. Patient C diff is negative and stool culture results are pending. She is on dual antibiotic therapy.     MEDICATIONS  (STANDING):  aspirin  chewable 81 milliGRAM(s) Oral daily  atorvastatin 20 milliGRAM(s) Oral at bedtime  ciprofloxacin   IVPB 400 milliGRAM(s) IV Intermittent every 12 hours  clopidogrel Tablet 75 milliGRAM(s) Oral daily  influenza   Vaccine 0.5 milliLiter(s) IntraMuscular once  insulin lispro (HumaLOG) corrective regimen sliding scale   SubCutaneous Before meals and at bedtime  metroNIDAZOLE  IVPB 500 milliGRAM(s) IV Intermittent every 8 hours  saccharomyces boulardii 250 milliGRAM(s) Oral two times a day    MEDICATIONS  (PRN):  acetaminophen   Tablet 650 milliGRAM(s) Oral every 6 hours PRN For Temp greater than 38 C (100.4 F)  acetaminophen   Tablet. 650 milliGRAM(s) Oral every 6 hours PRN Mild Pain (1 - 3)  ondansetron Injectable 4 milliGRAM(s) IV Push every 6 hours PRN Nausea and/or Vomiting  oxyCODONE    5 mG/acetaminophen 325 mG 1 Tablet(s) Oral every 4 hours PRN Moderate Pain (4 - 6)  oxyCODONE    5 mG/acetaminophen 325 mG 2 Tablet(s) Oral every 4 hours PRN Severe Pain (7 - 10)      Allergies    erythromycin (Unknown)    Intolerances        Vital Signs Last 24 Hrs  T(C): 36.5 (22 Nov 2017 00:34), Max: 37.4 (21 Nov 2017 09:00)  T(F): 97.7 (22 Nov 2017 00:34), Max: 99.3 (21 Nov 2017 09:00)  HR: 79 (22 Nov 2017 00:34) (76 - 80)  BP: 101/63 (22 Nov 2017 00:34) (100/59 - 101/63)  BP(mean): --  RR: 18 (22 Nov 2017 00:34) (18 - 20)  SpO2: 95% (22 Nov 2017 00:34) (95% - 97%)    LABS:                        11.1   9.4   )-----------( 270      ( 22 Nov 2017 07:11 )             34.6                 RADIOLOGY & ADDITIONAL TESTS: INTERVAL HPI/OVERNIGHT EVENTS:FU for abdominal pain, bloody diarrhea and segmental colitis after cardiac cath and hypotension episodes. Patient C diff is negative and stool culture results are pending. She is on dual antibiotic therapy. Still complains of dizziness    MEDICATIONS  (STANDING):  aspirin  chewable 81 milliGRAM(s) Oral daily  atorvastatin 20 milliGRAM(s) Oral at bedtime  ciprofloxacin   IVPB 400 milliGRAM(s) IV Intermittent every 12 hours  clopidogrel Tablet 75 milliGRAM(s) Oral daily  influenza   Vaccine 0.5 milliLiter(s) IntraMuscular once  insulin lispro (HumaLOG) corrective regimen sliding scale   SubCutaneous Before meals and at bedtime  metroNIDAZOLE  IVPB 500 milliGRAM(s) IV Intermittent every 8 hours  saccharomyces boulardii 250 milliGRAM(s) Oral two times a day    MEDICATIONS  (PRN):  acetaminophen   Tablet 650 milliGRAM(s) Oral every 6 hours PRN For Temp greater than 38 C (100.4 F)  acetaminophen   Tablet. 650 milliGRAM(s) Oral every 6 hours PRN Mild Pain (1 - 3)  ondansetron Injectable 4 milliGRAM(s) IV Push every 6 hours PRN Nausea and/or Vomiting  oxyCODONE    5 mG/acetaminophen 325 mG 1 Tablet(s) Oral every 4 hours PRN Moderate Pain (4 - 6)  oxyCODONE    5 mG/acetaminophen 325 mG 2 Tablet(s) Oral every 4 hours PRN Severe Pain (7 - 10)      Allergies    erythromycin (Unknown)    Intolerances        Vital Signs Last 24 Hrs  T(C): 36.5 (22 Nov 2017 00:34), Max: 37.4 (21 Nov 2017 09:00)  T(F): 97.7 (22 Nov 2017 00:34), Max: 99.3 (21 Nov 2017 09:00)  HR: 79 (22 Nov 2017 00:34) (76 - 80)  BP: 101/63 (22 Nov 2017 00:34) (100/59 - 101/63)  BP(mean): --  RR: 18 (22 Nov 2017 00:34) (18 - 20)  SpO2: 95% (22 Nov 2017 00:34) (95% - 97%)    LABS:                        11.1   9.4   )-----------( 270      ( 22 Nov 2017 07:11 )             34.6                 RADIOLOGY & ADDITIONAL TESTS:

## 2017-11-22 NOTE — PROGRESS NOTE ADULT - ASSESSMENT
Patient is a 52y old  Female who presents with a chief complaint of rectal bleeding (18 Nov 2017 23:29). currently being f/u for Ischaemic colitis.. complaining of dizziness. no other cardioresp symptoms except SOB which is not worsening. former smoker. ?component of COPD. does not neb or steroid. exam unremarkable. BM normalised. mo ssx of bleeding at this time/ H/H stable. stool culture still pending. moderately anxious. does not want intervention. no SI. BP lower normal

## 2017-11-23 ENCOUNTER — TRANSCRIPTION ENCOUNTER (OUTPATIENT)
Age: 52
End: 2017-11-23

## 2017-11-23 VITALS
DIASTOLIC BLOOD PRESSURE: 69 MMHG | OXYGEN SATURATION: 99 % | TEMPERATURE: 98 F | SYSTOLIC BLOOD PRESSURE: 107 MMHG | HEART RATE: 84 BPM | RESPIRATION RATE: 18 BRPM

## 2017-11-23 LAB
CULTURE RESULTS: SIGNIFICANT CHANGE UP
GLUCOSE BLDC GLUCOMTR-MCNC: 122 MG/DL — HIGH (ref 70–99)
GLUCOSE BLDC GLUCOMTR-MCNC: 125 MG/DL — HIGH (ref 70–99)
SPECIMEN SOURCE: SIGNIFICANT CHANGE UP

## 2017-11-23 PROCEDURE — 80053 COMPREHEN METABOLIC PANEL: CPT

## 2017-11-23 PROCEDURE — 96375 TX/PRO/DX INJ NEW DRUG ADDON: CPT

## 2017-11-23 PROCEDURE — 83735 ASSAY OF MAGNESIUM: CPT

## 2017-11-23 PROCEDURE — 85652 RBC SED RATE AUTOMATED: CPT

## 2017-11-23 PROCEDURE — 85027 COMPLETE CBC AUTOMATED: CPT

## 2017-11-23 PROCEDURE — 87086 URINE CULTURE/COLONY COUNT: CPT

## 2017-11-23 PROCEDURE — 86850 RBC ANTIBODY SCREEN: CPT

## 2017-11-23 PROCEDURE — 99232 SBSQ HOSP IP/OBS MODERATE 35: CPT

## 2017-11-23 PROCEDURE — 87493 C DIFF AMPLIFIED PROBE: CPT

## 2017-11-23 PROCEDURE — 80048 BASIC METABOLIC PNL TOTAL CA: CPT

## 2017-11-23 PROCEDURE — 99285 EMERGENCY DEPT VISIT HI MDM: CPT | Mod: 25

## 2017-11-23 PROCEDURE — 86901 BLOOD TYPING SEROLOGIC RH(D): CPT

## 2017-11-23 PROCEDURE — 85730 THROMBOPLASTIN TIME PARTIAL: CPT

## 2017-11-23 PROCEDURE — 36415 COLL VENOUS BLD VENIPUNCTURE: CPT

## 2017-11-23 PROCEDURE — 82962 GLUCOSE BLOOD TEST: CPT

## 2017-11-23 PROCEDURE — 85610 PROTHROMBIN TIME: CPT

## 2017-11-23 PROCEDURE — 93005 ELECTROCARDIOGRAM TRACING: CPT

## 2017-11-23 PROCEDURE — 87046 STOOL CULTR AEROBIC BACT EA: CPT

## 2017-11-23 PROCEDURE — 84100 ASSAY OF PHOSPHORUS: CPT

## 2017-11-23 PROCEDURE — 87177 OVA AND PARASITES SMEARS: CPT

## 2017-11-23 PROCEDURE — 82272 OCCULT BLD FECES 1-3 TESTS: CPT

## 2017-11-23 PROCEDURE — 86900 BLOOD TYPING SEROLOGIC ABO: CPT

## 2017-11-23 PROCEDURE — 86140 C-REACTIVE PROTEIN: CPT

## 2017-11-23 PROCEDURE — 74177 CT ABD & PELVIS W/CONTRAST: CPT

## 2017-11-23 PROCEDURE — 81001 URINALYSIS AUTO W/SCOPE: CPT

## 2017-11-23 PROCEDURE — 85018 HEMOGLOBIN: CPT

## 2017-11-23 PROCEDURE — 96374 THER/PROPH/DIAG INJ IV PUSH: CPT | Mod: XU

## 2017-11-23 PROCEDURE — 99238 HOSP IP/OBS DSCHRG MGMT 30/<: CPT

## 2017-11-23 PROCEDURE — 83690 ASSAY OF LIPASE: CPT

## 2017-11-23 PROCEDURE — 87045 FECES CULTURE AEROBIC BACT: CPT

## 2017-11-23 RX ORDER — CIPROFLOXACIN LACTATE 400MG/40ML
1 VIAL (ML) INTRAVENOUS
Qty: 10 | Refills: 0 | OUTPATIENT
Start: 2017-11-23 | End: 2017-11-28

## 2017-11-23 RX ORDER — ACETAMINOPHEN 500 MG
2 TABLET ORAL
Qty: 0 | Refills: 0 | COMMUNITY
Start: 2017-11-23

## 2017-11-23 RX ORDER — CIPROFLOXACIN LACTATE 400MG/40ML
1 VIAL (ML) INTRAVENOUS
Qty: 20 | Refills: 0 | OUTPATIENT
Start: 2017-11-23 | End: 2017-12-03

## 2017-11-23 RX ORDER — PREGABALIN 225 MG/1
1 CAPSULE ORAL
Qty: 0 | Refills: 0 | COMMUNITY

## 2017-11-23 RX ORDER — ONDANSETRON 8 MG/1
1 TABLET, FILM COATED ORAL
Qty: 42 | Refills: 0 | OUTPATIENT
Start: 2017-11-23 | End: 2017-12-07

## 2017-11-23 RX ORDER — PREGABALIN 225 MG/1
1 CAPSULE ORAL
Qty: 0 | Refills: 0 | COMMUNITY
Start: 2017-11-23

## 2017-11-23 RX ORDER — SACCHAROMYCES BOULARDII 250 MG
1 POWDER IN PACKET (EA) ORAL
Qty: 60 | Refills: 0 | OUTPATIENT
Start: 2017-11-23 | End: 2017-12-23

## 2017-11-23 RX ADMIN — Medication 250 MILLIGRAM(S): at 05:11

## 2017-11-23 RX ADMIN — Medication 200 MILLIGRAM(S): at 02:27

## 2017-11-23 NOTE — PROGRESS NOTE ADULT - PROVIDER SPECIALTY LIST ADULT
Cardiology
Family Medicine
Family Medicine
Gastroenterology
Gastroenterology
Hospitalist
Gastroenterology
Family Medicine
Gastroenterology

## 2017-11-23 NOTE — DISCHARGE NOTE ADULT - CARE PLAN
Principal Discharge DX:	Colitis, acute  Goal:	prevent recurrent. symptoms control  Instructions for follow-up, activity and diet:	finish cipro. f/u with Dr Mendoza in 1 week.  Secondary Diagnosis:	Dizziness and giddiness  Instructions for follow-up, activity and diet:	fall precaution. get out of bed  or chair slowly. f/u with cardiology in 1-2 weeks. call PCP if symptoms recur  Secondary Diagnosis:	Coronary artery disease involving native coronary artery of native heart without angina pectoris  Instructions for follow-up, activity and diet:	c/w meds. f/u with cardiology in 1-2 weeks  Secondary Diagnosis:	Hyperlipidemia, unspecified hyperlipidemia type  Instructions for follow-up, activity and diet:	c/w home meds. low fat diet  Secondary Diagnosis:	Rectal bleeding  Instructions for follow-up, activity and diet:	seek medical attention immediately if recur. watch for black stool  Secondary Diagnosis:	Anxiety about health  Instructions for follow-up, activity and diet:	try to do meditation/yoga/message. f/u your family doc Principal Discharge DX:	Colitis, acute  Goal:	prevent recurrent. symptoms control  Instructions for follow-up, activity and diet:	finish cipro. f/u with Dr Mendoza in 1 week.  Secondary Diagnosis:	Dizziness and giddiness  Instructions for follow-up, activity and diet:	fall precaution. get out of bed  or chair slowly. f/u with cardiology in 1-2 weeks. call PCP if symptoms recur  Secondary Diagnosis:	Coronary artery disease involving native coronary artery of native heart without angina pectoris  Instructions for follow-up, activity and diet:	c/w meds. f/u with cardiology in 1-2 weeks  Secondary Diagnosis:	Hyperlipidemia, unspecified hyperlipidemia type  Instructions for follow-up, activity and diet:	c/w home meds. low fat diet  Secondary Diagnosis:	Rectal bleeding  Instructions for follow-up, activity and diet:	finish abx. see Gastro in 1 week. ER if bleeding increasing. watch for black stool  Secondary Diagnosis:	Anxiety about health  Instructions for follow-up, activity and diet:	try to do meditation/yoga/message. f/u your family doc

## 2017-11-23 NOTE — DISCHARGE NOTE ADULT - MEDICATION SUMMARY - MEDICATIONS TO TAKE
I will START or STAY ON the medications listed below when I get home from the hospital:    aspirin 81 mg oral tablet, chewable  -- 1 tab(s) by mouth once a day  -- Indication: For CAD (coronary artery disease)    lisinopril 2.5 mg oral tablet  -- 1 tab(s) by mouth once a day  -- Indication: For DM (diabetes mellitus)    ondansetron 4 mg oral tablet  -- 1 tab(s) by mouth every 8 hours MDD:PRN for nausea/vomitting  -- Indication: For Nausea/vomitting    atorvastatin 20 mg oral tablet  -- 1 tab(s) by mouth once a day (at bedtime)  -- Indication: For CAD (coronary artery disease)    clopidogrel 75 mg oral tablet  -- 1 tab(s) by mouth once a day  -- Indication: For CAD (coronary artery disease)    metoprolol succinate 25 mg oral tablet, extended release  -- 1 tab(s) by mouth once a day   -- It is very important that you take or use this exactly as directed.  Do not skip doses or discontinue unless directed by your doctor.  May cause drowsiness.  Alcohol may intensify this effect.  Use care when operating dangerous machinery.  Some non-prescription drugs may aggravate your condition.  Read all labels carefully.  If a warning appears, check with your doctor before taking.  Swallow whole.  Do not crush.  Take with food or milk.  This drug may impair the ability to drive or operate machinery.  Use care until you become familiar with its effects.    -- Indication: For CAD (coronary artery disease)    saccharomyces boulardii lyo 250 mg oral capsule  -- 1 cap(s) by mouth 2 times a day  -- Indication: For general    ciprofloxacin 500 mg oral tablet  -- 1 tab(s) by mouth every 12 hours  -- Indication: For Colitis, acute    cyanocobalamin 1000 mcg oral tablet  -- 1 tab(s) by mouth once a day  -- Indication: For general

## 2017-11-23 NOTE — DISCHARGE NOTE ADULT - CARE PROVIDER_API CALL
Arie Mendoza), Gastroenterology; Internal Medicine  [address] [place] [address]  Phone: [phone]  Fax: [phone]

## 2017-11-23 NOTE — DISCHARGE NOTE ADULT - OTHER SIGNIFICANT FINDINGS
< from: CT Abdomen and Pelvis w/ Oral Cont and w/ IV Cont (11.18.17 @ 21:15) >  MPRESSION:    Mild circumferential thickening of the mid to distal transverse colon and   descending colon with slight infiltration of the pericolonic fat   reflective of a colitis. No evidence of perforation.    Enlarged fibroid uterus measuring up to 18.5 cm in craniocaudal dimension   with largest discrete fibroid measuring up to 14.5 cm. Bilateral adnexal   cystic lesions, some of which on the right side demonstrate peripheral   calcification and some of which appear tubular in nature. Similar   findings were seen on prior exams from 2006 and the current findings may   be reflective of chronic changes secondary to prior pelvic inflammatory   disease including dilated fluid-filled fallopian tubes. If there are   findings referrable to the pelvis, sonographic evaluation may be   considered.    Hepatomegaly with mild diffuse hepatic steatosis.    < end of copied text >

## 2017-11-23 NOTE — DISCHARGE NOTE ADULT - PLAN OF CARE
prevent recurrent. symptoms control finish cipro. f/u with Dr Mendoza in 1 week. fall precaution. get out of bed  or chair slowly. f/u with cardiology in 1-2 weeks. call PCP if symptoms recur c/w meds. f/u with cardiology in 1-2 weeks c/w home meds. low fat diet seek medical attention immediately if recur. watch for black stool try to do meditation/yoga/message. f/u your family doc finish abx. see Gastro in 1 week. ER if bleeding increasing. watch for black stool

## 2017-11-23 NOTE — DISCHARGE NOTE ADULT - SECONDARY DIAGNOSIS.
Dizziness and giddiness Coronary artery disease involving native coronary artery of native heart without angina pectoris Hyperlipidemia, unspecified hyperlipidemia type Rectal bleeding Anxiety about health

## 2017-11-23 NOTE — PROGRESS NOTE ADULT - PROBLEM SELECTOR PLAN 1
GI eval greatly appreciated; cont iv abx, clears, iv fluids
on cipro. BM regular. cont abx per GI; stool culture pending
Ischemic colitis.   Give total of 10 days antibiotics   low fiber diet  F/U in  2-3 weeks with Dr Peacock
cont abx per GI; stool culture pending
cont iv abx for now

## 2017-11-23 NOTE — DISCHARGE NOTE ADULT - HOSPITAL COURSE
52y Female with past medical history significant for HLD, DM, past tobacco use, CAD sp recent YUE to LCx with residual 90% stenosis of D2 with abdominal pain, nausea, vomiting and BRBPR with ischemic colitis. all symptoms are better. 52y Female with past medical history significant for HLD, DM, past tobacco use, CAD sp recent YUE to LCx with residual 90% stenosis of D2 with abdominal pain, nausea, vomiting and BRBPR with ischemic colitis. all symptoms are better except ine episode of trace Blood with BM today.

## 2017-11-23 NOTE — PROGRESS NOTE ADULT - SUBJECTIVE AND OBJECTIVE BOX
Patient is a 52y old  Female who presents with a chief complaint of rectal bleeding (23 Nov 2017 11:03)      HPI:  53 y/o female with PMH of CAD s/p 2 YUE to Lcx, HLD, DM (not on insulin), uterine fibroids, endometriosis, presents with complaints of rectal bleeding.  Stool studies negative. had one Bm with scant blood mixed in. H+h stable. Tolerating solid food. No fevers, no abdominal pain    REVIEW OF SYSTEMS:  Constitutional: No fever, weight loss or fatigue  ENMT:  No difficulty hearing, tinnitus, vertigo; No sinus or throat pain  Respiratory: No cough, wheezing, chills or hemoptysis  Cardiovascular: No chest pain, palpitations, dizziness or leg swelling  Gastrointestinal: No abdominal or epigastric pain. No nausea, vomiting or hematemesis; No diarrhea or constipation. +hematochezia.  Skin: No itching, burning, rashes or lesions   Musculoskeletal: No joint pain or swelling; No muscle, back or extremity pain    PAST MEDICAL & SURGICAL HISTORY:  Hyperlipidemia, unspecified hyperlipidemia type  Uterine leiomyoma, unspecified location  CAD (coronary artery disease)  DM (diabetes mellitus): tried nutritionist and diet change 2016   when  first  hgba1c  was elevated,  repeat is  11  to  f/u with md  Former smoker  H/O heart artery stent  Breast implant status: left breast implant  redone due to collapse of  original implant  History of tonsillectomy      FAMILY HISTORY:  Family history of atrial fibrillation (Father)      SOCIAL HISTORY:  Smoking Status: [ ] Current, [ ] Former, [ ] Never  Pack Years:  [  ] EtOH-no  [  ] IVDA-no    MEDICATIONS:  MEDICATIONS  (STANDING):  aspirin  chewable 81 milliGRAM(s) Oral daily  atorvastatin 20 milliGRAM(s) Oral at bedtime  ciprofloxacin   IVPB 400 milliGRAM(s) IV Intermittent every 12 hours  clopidogrel Tablet 75 milliGRAM(s) Oral daily  influenza   Vaccine 0.5 milliLiter(s) IntraMuscular once  insulin lispro (HumaLOG) corrective regimen sliding scale   SubCutaneous Before meals and at bedtime  saccharomyces boulardii 250 milliGRAM(s) Oral two times a day    MEDICATIONS  (PRN):  acetaminophen   Tablet 650 milliGRAM(s) Oral every 6 hours PRN For Temp greater than 38 C (100.4 F)  acetaminophen   Tablet. 650 milliGRAM(s) Oral every 6 hours PRN Mild Pain (1 - 3)  ondansetron Injectable 4 milliGRAM(s) IV Push every 6 hours PRN Nausea and/or Vomiting  oxyCODONE    5 mG/acetaminophen 325 mG 1 Tablet(s) Oral every 4 hours PRN Moderate Pain (4 - 6)  oxyCODONE    5 mG/acetaminophen 325 mG 2 Tablet(s) Oral every 4 hours PRN Severe Pain (7 - 10)      Allergies    erythromycin (Unknown)    Intolerances        Vital Signs Last 24 Hrs  T(C): 36.7 (23 Nov 2017 08:34), Max: 37 (22 Nov 2017 23:32)  T(F): 98 (23 Nov 2017 08:34), Max: 98.6 (22 Nov 2017 23:32)  HR: 84 (23 Nov 2017 08:34) (78 - 84)  BP: 107/69 (23 Nov 2017 08:34) (103/68 - 114/76)  BP(mean): --  RR: 18 (23 Nov 2017 08:34) (18 - 18)  SpO2: 99% (23 Nov 2017 08:34) (94% - 99%)        PHYSICAL EXAM:    General: Well developed; well nourished; in no acute distress  HEENT: MMM, conjunctiva and sclera clear  Gastrointestinal: Soft, non-tender non-distended; Normal bowel sounds; No rebound or guarding  Extremities: Normal range of motion, No clubbing, cyanosis or edema  Neurological: Alert and oriented x3  Skin: Warm and dry. No obvious rash      LABS:                        11.1   9.4   )-----------( 270      ( 22 Nov 2017 07:11 )             34.6                     RADIOLOGY & ADDITIONAL STUDIES:     < from: CT Abdomen and Pelvis w/ Oral Cont and w/ IV Cont (11.18.17 @ 21:15) >    Mild circumferential thickening of the mid to distal transverse colon and   descending colon with slight infiltration of the pericolonic fat   reflective of a colitis. No evidence of perforation.    Enlarged fibroid uterus measuring up to 18.5 cm in craniocaudal dimension   with largest discrete fibroid measuring up to 14.5 cm. Bilateral adnexal   cystic lesions, some of which on the right side demonstrate peripheral   calcification and some of which appear tubular in nature. Similar   findings were seen on prior exams from 2006 and the current findings may   be reflective of chronic changes secondary to prior pelvic inflammatory   disease including dilated fluid-filled fallopian tubes. If there are   findings referrable to the pelvis, sonographic evaluation may be   considered.    Hepatomegaly with mild diffuse hepatic steatosis.      < end of copied text >

## 2017-12-09 ENCOUNTER — INPATIENT (INPATIENT)
Facility: HOSPITAL | Age: 52
LOS: 2 days | Discharge: HOME | DRG: 313 | End: 2017-12-12
Attending: FAMILY MEDICINE | Admitting: HOSPITALIST
Payer: COMMERCIAL

## 2017-12-09 VITALS
RESPIRATION RATE: 18 BRPM | HEIGHT: 65 IN | TEMPERATURE: 98 F | SYSTOLIC BLOOD PRESSURE: 130 MMHG | OXYGEN SATURATION: 96 % | DIASTOLIC BLOOD PRESSURE: 84 MMHG | HEART RATE: 105 BPM | WEIGHT: 166.89 LBS

## 2017-12-09 DIAGNOSIS — Z98.82 BREAST IMPLANT STATUS: Chronic | ICD-10-CM

## 2017-12-09 DIAGNOSIS — Z95.5 PRESENCE OF CORONARY ANGIOPLASTY IMPLANT AND GRAFT: Chronic | ICD-10-CM

## 2017-12-09 DIAGNOSIS — Z90.89 ACQUIRED ABSENCE OF OTHER ORGANS: Chronic | ICD-10-CM

## 2017-12-09 DIAGNOSIS — R07.9 CHEST PAIN, UNSPECIFIED: ICD-10-CM

## 2017-12-09 LAB
ALBUMIN SERPL ELPH-MCNC: 4.4 G/DL — SIGNIFICANT CHANGE UP (ref 3.3–5.2)
ALP SERPL-CCNC: 89 U/L — SIGNIFICANT CHANGE UP (ref 40–120)
ALT FLD-CCNC: 17 U/L — SIGNIFICANT CHANGE UP
ANION GAP SERPL CALC-SCNC: 17 MMOL/L — SIGNIFICANT CHANGE UP (ref 5–17)
AST SERPL-CCNC: 14 U/L — SIGNIFICANT CHANGE UP
BASOPHILS # BLD AUTO: 0 K/UL — SIGNIFICANT CHANGE UP (ref 0–0.2)
BASOPHILS NFR BLD AUTO: 0.2 % — SIGNIFICANT CHANGE UP (ref 0–2)
BILIRUB SERPL-MCNC: 0.5 MG/DL — SIGNIFICANT CHANGE UP (ref 0.4–2)
BUN SERPL-MCNC: 11 MG/DL — SIGNIFICANT CHANGE UP (ref 8–20)
CALCIUM SERPL-MCNC: 9.6 MG/DL — SIGNIFICANT CHANGE UP (ref 8.6–10.2)
CHLORIDE SERPL-SCNC: 93 MMOL/L — LOW (ref 98–107)
CK SERPL-CCNC: 84 U/L — SIGNIFICANT CHANGE UP (ref 25–170)
CO2 SERPL-SCNC: 24 MMOL/L — SIGNIFICANT CHANGE UP (ref 22–29)
CREAT SERPL-MCNC: 0.44 MG/DL — LOW (ref 0.5–1.3)
D DIMER BLD IA.RAPID-MCNC: 740 NG/ML DDU — HIGH
EOSINOPHIL # BLD AUTO: 0.1 K/UL — SIGNIFICANT CHANGE UP (ref 0–0.5)
EOSINOPHIL NFR BLD AUTO: 0.7 % — SIGNIFICANT CHANGE UP (ref 0–6)
GLUCOSE BLDC GLUCOMTR-MCNC: 209 MG/DL — HIGH (ref 70–99)
GLUCOSE SERPL-MCNC: 173 MG/DL — HIGH (ref 70–115)
HCT VFR BLD CALC: 35.3 % — LOW (ref 37–47)
HGB BLD-MCNC: 11.6 G/DL — LOW (ref 12–16)
LYMPHOCYTES # BLD AUTO: 18.6 % — LOW (ref 20–55)
LYMPHOCYTES # BLD AUTO: 2.4 K/UL — SIGNIFICANT CHANGE UP (ref 1–4.8)
MCHC RBC-ENTMCNC: 27.6 PG — SIGNIFICANT CHANGE UP (ref 27–31)
MCHC RBC-ENTMCNC: 32.9 G/DL — SIGNIFICANT CHANGE UP (ref 32–36)
MCV RBC AUTO: 83.8 FL — SIGNIFICANT CHANGE UP (ref 81–99)
MONOCYTES # BLD AUTO: 1.5 K/UL — HIGH (ref 0–0.8)
MONOCYTES NFR BLD AUTO: 11.7 % — HIGH (ref 3–10)
NEUTROPHILS # BLD AUTO: 8.9 K/UL — HIGH (ref 1.8–8)
NEUTROPHILS NFR BLD AUTO: 68.6 % — SIGNIFICANT CHANGE UP (ref 37–73)
PLATELET # BLD AUTO: 472 K/UL — HIGH (ref 150–400)
POTASSIUM SERPL-MCNC: 4.5 MMOL/L — SIGNIFICANT CHANGE UP (ref 3.5–5.3)
POTASSIUM SERPL-SCNC: 4.5 MMOL/L — SIGNIFICANT CHANGE UP (ref 3.5–5.3)
PROT SERPL-MCNC: 8.7 G/DL — SIGNIFICANT CHANGE UP (ref 6.6–8.7)
RBC # BLD: 4.21 M/UL — LOW (ref 4.4–5.2)
RBC # FLD: 14.1 % — SIGNIFICANT CHANGE UP (ref 11–15.6)
SODIUM SERPL-SCNC: 134 MMOL/L — LOW (ref 135–145)
TROPONIN T SERPL-MCNC: <0.01 NG/ML — SIGNIFICANT CHANGE UP (ref 0–0.06)
WBC # BLD: 12.9 K/UL — HIGH (ref 4.8–10.8)
WBC # FLD AUTO: 12.9 K/UL — HIGH (ref 4.8–10.8)

## 2017-12-09 PROCEDURE — 99222 1ST HOSP IP/OBS MODERATE 55: CPT

## 2017-12-09 PROCEDURE — 99285 EMERGENCY DEPT VISIT HI MDM: CPT

## 2017-12-09 PROCEDURE — 71020: CPT | Mod: 26

## 2017-12-09 PROCEDURE — 71275 CT ANGIOGRAPHY CHEST: CPT | Mod: 26

## 2017-12-09 RX ORDER — ENOXAPARIN SODIUM 100 MG/ML
40 INJECTION SUBCUTANEOUS DAILY
Qty: 0 | Refills: 0 | Status: DISCONTINUED | OUTPATIENT
Start: 2017-12-09 | End: 2017-12-12

## 2017-12-09 RX ORDER — DEXTROSE 50 % IN WATER 50 %
25 SYRINGE (ML) INTRAVENOUS ONCE
Qty: 0 | Refills: 0 | Status: DISCONTINUED | OUTPATIENT
Start: 2017-12-09 | End: 2017-12-12

## 2017-12-09 RX ORDER — ATORVASTATIN CALCIUM 80 MG/1
20 TABLET, FILM COATED ORAL AT BEDTIME
Qty: 0 | Refills: 0 | Status: DISCONTINUED | OUTPATIENT
Start: 2017-12-09 | End: 2017-12-12

## 2017-12-09 RX ORDER — INFLUENZA VIRUS VACCINE 15; 15; 15; 15 UG/.5ML; UG/.5ML; UG/.5ML; UG/.5ML
0.5 SUSPENSION INTRAMUSCULAR ONCE
Qty: 0 | Refills: 0 | Status: DISCONTINUED | OUTPATIENT
Start: 2017-12-09 | End: 2017-12-12

## 2017-12-09 RX ORDER — ACETAMINOPHEN 500 MG
650 TABLET ORAL EVERY 6 HOURS
Qty: 0 | Refills: 0 | Status: DISCONTINUED | OUTPATIENT
Start: 2017-12-09 | End: 2017-12-12

## 2017-12-09 RX ORDER — DEXTROSE 50 % IN WATER 50 %
1 SYRINGE (ML) INTRAVENOUS ONCE
Qty: 0 | Refills: 0 | Status: DISCONTINUED | OUTPATIENT
Start: 2017-12-09 | End: 2017-12-12

## 2017-12-09 RX ORDER — METOPROLOL TARTRATE 50 MG
25 TABLET ORAL DAILY
Qty: 0 | Refills: 0 | Status: DISCONTINUED | OUTPATIENT
Start: 2017-12-09 | End: 2017-12-12

## 2017-12-09 RX ORDER — ASPIRIN/CALCIUM CARB/MAGNESIUM 324 MG
81 TABLET ORAL DAILY
Qty: 0 | Refills: 0 | Status: DISCONTINUED | OUTPATIENT
Start: 2017-12-09 | End: 2017-12-12

## 2017-12-09 RX ORDER — INSULIN LISPRO 100/ML
VIAL (ML) SUBCUTANEOUS
Qty: 0 | Refills: 0 | Status: DISCONTINUED | OUTPATIENT
Start: 2017-12-09 | End: 2017-12-12

## 2017-12-09 RX ORDER — DEXTROSE 50 % IN WATER 50 %
12.5 SYRINGE (ML) INTRAVENOUS ONCE
Qty: 0 | Refills: 0 | Status: DISCONTINUED | OUTPATIENT
Start: 2017-12-09 | End: 2017-12-12

## 2017-12-09 RX ORDER — GLUCAGON INJECTION, SOLUTION 0.5 MG/.1ML
1 INJECTION, SOLUTION SUBCUTANEOUS ONCE
Qty: 0 | Refills: 0 | Status: DISCONTINUED | OUTPATIENT
Start: 2017-12-09 | End: 2017-12-12

## 2017-12-09 RX ORDER — SODIUM CHLORIDE 9 MG/ML
1000 INJECTION, SOLUTION INTRAVENOUS
Qty: 0 | Refills: 0 | Status: DISCONTINUED | OUTPATIENT
Start: 2017-12-09 | End: 2017-12-12

## 2017-12-09 RX ORDER — CLOPIDOGREL BISULFATE 75 MG/1
75 TABLET, FILM COATED ORAL DAILY
Qty: 0 | Refills: 0 | Status: DISCONTINUED | OUTPATIENT
Start: 2017-12-09 | End: 2017-12-12

## 2017-12-09 RX ORDER — ACETAMINOPHEN 500 MG
650 TABLET ORAL ONCE
Qty: 0 | Refills: 0 | Status: COMPLETED | OUTPATIENT
Start: 2017-12-09 | End: 2017-12-09

## 2017-12-09 RX ORDER — PREGABALIN 225 MG/1
1000 CAPSULE ORAL DAILY
Qty: 0 | Refills: 0 | Status: DISCONTINUED | OUTPATIENT
Start: 2017-12-09 | End: 2017-12-12

## 2017-12-09 RX ADMIN — Medication 650 MILLIGRAM(S): at 20:22

## 2017-12-09 RX ADMIN — CLOPIDOGREL BISULFATE 75 MILLIGRAM(S): 75 TABLET, FILM COATED ORAL at 20:22

## 2017-12-09 RX ADMIN — Medication 25 MILLIGRAM(S): at 21:25

## 2017-12-09 RX ADMIN — Medication 650 MILLIGRAM(S): at 14:36

## 2017-12-09 RX ADMIN — ATORVASTATIN CALCIUM 20 MILLIGRAM(S): 80 TABLET, FILM COATED ORAL at 22:27

## 2017-12-09 NOTE — H&P ADULT - NSHPREVIEWOFSYSTEMS_GEN_ALL_CORE
CONSTITUTIONAL: No fever, no fatigue  RESPIRATORY: some times has cough, intermittent shortness of breath  CARDIOVASCULAR: has chest pain while sob, no palpitations  GASTROINTESTINAL: No abdominal, No nausea, vomiting  NEUROLOGICAL: No headaches,  loss of strength.  MISCELLANEOUS: No joint swelling or pain

## 2017-12-09 NOTE — ED PROVIDER NOTE - PMH
CAD (coronary artery disease)    DM (diabetes mellitus)  tried nutritionist and diet change 2016   when  first  hgba1c  was elevated,  repeat is  11  to  f/u with md  Former smoker    Hyperlipidemia, unspecified hyperlipidemia type    Uterine leiomyoma, unspecified location

## 2017-12-09 NOTE — ED ADULT NURSE NOTE - OBJECTIVE STATEMENT
Patient A&OX3, c/o chest pain, jaw pain and right shoulder pain. Patient had 2 stents placed 11/17/17. VSS. CM in place. NSR. Patient A&OX3, c/o chest pain, jaw pain and right shoulder pain. Patient had 2 stents placed 11/17/17. VSS. CM in place. NSR.  Patient states symptoms have been present since stents. Dr. Correia sent patient to ED.

## 2017-12-09 NOTE — ED PROVIDER NOTE - OBJECTIVE STATEMENT
This patient is a 52 year old woman with hx of CAD with 2 cardiac stents placed 3 weeks ago who presents to the ER c/o intermittent continued midsternal chest pain that has continued since the stents were placed.  Over the past week the pain has been increased when taking a deep breath.  For the past 2-3 days she has been also experiencing right sided neck and shoulder pain but does not want to take any medication for the pain.  She denies injury or trauma.  She was seen by her PMD (Dr. Em) This patient is a 52 year old woman with hx of CAD with 2 cardiac stents placed 3 weeks ago who presents to the ER c/o intermittent continued midsternal chest pain that has continued since the stents were placed.  Over the past week the pain has been increased when taking a deep breath.  For the past 2-3 days she has been also experiencing right sided neck and shoulder pain but does not want to take any medication for the pain.  The pain is worse with movement.  She denies injury or trauma.  She was seen by her PMD (Dr. Em) yesterday and instructed to come to the ER.  Patient states that she was placed on Metoprolol after the cardiac catheterization 3 weeks ago but just started taking the medication a few days ago.

## 2017-12-09 NOTE — H&P ADULT - NSHPPHYSICALEXAM_GEN_ALL_CORE
Vital Signs Last 24 Hrs  T(C): 36.7 (09 Dec 2017 17:56), Max: 36.7 (09 Dec 2017 17:56)  T(F): 98 (09 Dec 2017 17:56), Max: 98 (09 Dec 2017 17:56)  HR: 100 (09 Dec 2017 17:56) (100 - 105)  BP: 115/74 (09 Dec 2017 17:56) (115/74 - 130/84)  RR: 18 (09 Dec 2017 17:56) (18 - 18)  SpO2: 96% (09 Dec 2017 17:56) (96% - 96%)    PHYSICAL EXAM:    GENERAL: Middle age female looking comfortable  HEENT: PERRL, +EOMI  NECK: soft, Supple, No JVD,   CHEST/LUNG: Clear to auscultate bilaterally; No wheezing  HEART: S1S2+, Regular rate and rhythm; No murmurs.   ABDOMEN: Soft, Nontender, Nondistended; Bowel sounds present  EXTREMITIES:  1+ Peripheral Pulses, No edema  SKIN: No rashes or lesions  NEURO: AAOX3, no focal deficits, no motor r sensory loss  PSYCH: normal mood

## 2017-12-09 NOTE — ED PROVIDER NOTE - MEDICAL DECISION MAKING DETAILS
Intermittent chest pain or over 4 weeks not resolved.  New muscular neck and shoulder pain x 2 days sent by PMD.  Will check EKG, Labs, CXR, and consult cardiology.

## 2017-12-09 NOTE — H&P ADULT - HISTORY OF PRESENT ILLNESS
51 y/o female with Hx of CAD s/p 2 YUE to Lcx 3 weeks ago, HLD, DM diet controlled, uterine fibroids, endometriosis, she was recently discharged from the hospital because of rectal bleeding and was due to colitis, as per patient since she got stent she has been having intermittent SOB associated with chest pressure like pain and right shoulder pain, she has been having intermittent cough as well, she has no fever, chills, nausea, vomiting, palpitation, dizziness, she is not exerting, so she cant not tell any relation with exercise.

## 2017-12-09 NOTE — H&P ADULT - NSHPLABSRESULTS_GEN_ALL_CORE
11.6   12.9  )-----------( 472      ( 09 Dec 2017 14:40 )             35.3     12-09    134<L>  |  93<L>  |  11.0  ----------------------------<  173<H>  4.5   |  24.0  |  0.44<L>    Ca    9.6      09 Dec 2017 14:40    TPro  8.7  /  Alb  4.4  /  TBili  0.5  /  DBili  x   /  AST  14  /  ALT  17  /  AlkPhos  89  12-09    EKG: NSR, no st and t wave changes    < from: CT Angio Chest w/ IV Cont (12.09.17 @ 17:10) >    LUNGS AND LARGE AIRWAYS: Compressive atelectasis in the right lower lobe.  PLEURA: Small right pleural effusion.  VESSELS: Adequatecontrast opacification of the pulmonary arteries which   are visualized to the level of the segmental arteries. No pulmonary   embolism.   HEART: Heart size is normal. Small pericardial effusion.  MEDIASTINUM AND DAVID: No lymphadenopathy.  CHEST WALL AND LOWER NECK: Left breast implant.  VISUALIZED UPPER ABDOMEN: Within normal limits.  BONES: Degenerative changes of the spine.    IMPRESSION: No pulmonary embolism.    < end of copied text >

## 2017-12-09 NOTE — ED ADULT TRIAGE NOTE - NSWEIGHTCALCTOOLDRUG_GEN_A_CORE
Refer to \"For your Well Being\" AboutYour Procedural Sedation and Radiofrequency Ablation as discussed.    used

## 2017-12-09 NOTE — ED ADULT TRIAGE NOTE - CHIEF COMPLAINT QUOTE
Patient arrived ambulatory to ED, awake, alert, and oriented times 3 ,breathing unlabored.  Patient complaining midsternal chest pain radiating to right arm, jaw.  Patient had 2 stents placed 11/17/17.  Patient states symptoms have been present since stents, but have been getting worse.  Dr. Correia sent patient to ED

## 2017-12-09 NOTE — H&P ADULT - ASSESSMENT
Assessment: Given above patient info, patient is having intermittent sob associated with chest pain, CT chest shows some atelectasis and minimal pleural effusion, her chest pain looks like pleuritic, or could be related to CAD    Plan:      SOB with chest pain: will r/o ACS, will resume aspirin and plavis Assessment: Given above patient info, patient is having intermittent sob associated with chest pain, CT chest shows some atelectasis and minimal pleural effusion, her chest pain looks like pleuritic, or could be related to CAD    Plan:      SOB with chest pain: will r/o ACS, will resume aspirin, Plavix and metorpolol, will continue with statins, will cycle trops, will get an Echo, cardiology consult has been called by ED, it could be pleuritic chest pain, will provide IS and some tylenol as needed.     Hx of DM: as per patient it is diet controlled, will monitor FS and provider coverage insulin.     Hx of HLD: on statins, will repeat Lipid panel.     DVT prophylaxis: Lovenox

## 2017-12-10 DIAGNOSIS — I25.10 ATHEROSCLEROTIC HEART DISEASE OF NATIVE CORONARY ARTERY WITHOUT ANGINA PECTORIS: ICD-10-CM

## 2017-12-10 DIAGNOSIS — E11.9 TYPE 2 DIABETES MELLITUS WITHOUT COMPLICATIONS: ICD-10-CM

## 2017-12-10 DIAGNOSIS — R07.9 CHEST PAIN, UNSPECIFIED: ICD-10-CM

## 2017-12-10 LAB
ALBUMIN SERPL ELPH-MCNC: 3.8 G/DL — SIGNIFICANT CHANGE UP (ref 3.3–5.2)
ALP SERPL-CCNC: 80 U/L — SIGNIFICANT CHANGE UP (ref 40–120)
ALT FLD-CCNC: 16 U/L — SIGNIFICANT CHANGE UP
ANION GAP SERPL CALC-SCNC: 16 MMOL/L — SIGNIFICANT CHANGE UP (ref 5–17)
APTT BLD: 32.9 SEC — SIGNIFICANT CHANGE UP (ref 27.5–37.4)
AST SERPL-CCNC: 11 U/L — SIGNIFICANT CHANGE UP
BILIRUB SERPL-MCNC: 0.5 MG/DL — SIGNIFICANT CHANGE UP (ref 0.4–2)
BUN SERPL-MCNC: 10 MG/DL — SIGNIFICANT CHANGE UP (ref 8–20)
CALCIUM SERPL-MCNC: 9.2 MG/DL — SIGNIFICANT CHANGE UP (ref 8.6–10.2)
CHLORIDE SERPL-SCNC: 98 MMOL/L — SIGNIFICANT CHANGE UP (ref 98–107)
CHOLEST SERPL-MCNC: 138 MG/DL — SIGNIFICANT CHANGE UP (ref 110–199)
CO2 SERPL-SCNC: 24 MMOL/L — SIGNIFICANT CHANGE UP (ref 22–29)
CREAT SERPL-MCNC: 0.39 MG/DL — LOW (ref 0.5–1.3)
GLUCOSE BLDC GLUCOMTR-MCNC: 135 MG/DL — HIGH (ref 70–99)
GLUCOSE BLDC GLUCOMTR-MCNC: 188 MG/DL — HIGH (ref 70–99)
GLUCOSE BLDC GLUCOMTR-MCNC: 193 MG/DL — HIGH (ref 70–99)
GLUCOSE BLDC GLUCOMTR-MCNC: 212 MG/DL — HIGH (ref 70–99)
GLUCOSE SERPL-MCNC: 185 MG/DL — HIGH (ref 70–115)
HBA1C BLD-MCNC: 8.4 % — HIGH (ref 4–5.6)
HCT VFR BLD CALC: 32.7 % — LOW (ref 37–47)
HDLC SERPL-MCNC: 39 MG/DL — LOW
HGB BLD-MCNC: 10.2 G/DL — LOW (ref 12–16)
INR BLD: 1.24 RATIO — HIGH (ref 0.88–1.16)
LIPID PNL WITH DIRECT LDL SERPL: 76 MG/DL — SIGNIFICANT CHANGE UP
MCHC RBC-ENTMCNC: 26 PG — LOW (ref 27–31)
MCHC RBC-ENTMCNC: 31.2 G/DL — LOW (ref 32–36)
MCV RBC AUTO: 83.2 FL — SIGNIFICANT CHANGE UP (ref 81–99)
PLATELET # BLD AUTO: 439 K/UL — HIGH (ref 150–400)
POTASSIUM SERPL-MCNC: 4 MMOL/L — SIGNIFICANT CHANGE UP (ref 3.5–5.3)
POTASSIUM SERPL-SCNC: 4 MMOL/L — SIGNIFICANT CHANGE UP (ref 3.5–5.3)
PROT SERPL-MCNC: 7.9 G/DL — SIGNIFICANT CHANGE UP (ref 6.6–8.7)
PROTHROM AB SERPL-ACNC: 13.7 SEC — HIGH (ref 9.8–12.7)
RBC # BLD: 3.93 M/UL — LOW (ref 4.4–5.2)
RBC # FLD: 14.1 % — SIGNIFICANT CHANGE UP (ref 11–15.6)
SODIUM SERPL-SCNC: 138 MMOL/L — SIGNIFICANT CHANGE UP (ref 135–145)
TOTAL CHOLESTEROL/HDL RATIO MEASUREMENT: 4 RATIO — SIGNIFICANT CHANGE UP (ref 3.3–7.1)
TRIGL SERPL-MCNC: 114 MG/DL — SIGNIFICANT CHANGE UP (ref 10–200)
TROPONIN T SERPL-MCNC: <0.01 NG/ML — SIGNIFICANT CHANGE UP (ref 0–0.06)
TROPONIN T SERPL-MCNC: <0.01 NG/ML — SIGNIFICANT CHANGE UP (ref 0–0.06)
WBC # BLD: 9.9 K/UL — SIGNIFICANT CHANGE UP (ref 4.8–10.8)
WBC # FLD AUTO: 9.9 K/UL — SIGNIFICANT CHANGE UP (ref 4.8–10.8)

## 2017-12-10 RX ADMIN — CLOPIDOGREL BISULFATE 75 MILLIGRAM(S): 75 TABLET, FILM COATED ORAL at 17:40

## 2017-12-10 RX ADMIN — Medication 650 MILLIGRAM(S): at 17:40

## 2017-12-10 RX ADMIN — Medication 650 MILLIGRAM(S): at 04:15

## 2017-12-10 RX ADMIN — Medication 25 MILLIGRAM(S): at 22:49

## 2017-12-10 RX ADMIN — ATORVASTATIN CALCIUM 20 MILLIGRAM(S): 80 TABLET, FILM COATED ORAL at 22:49

## 2017-12-10 RX ADMIN — Medication 650 MILLIGRAM(S): at 23:44

## 2017-12-10 RX ADMIN — Medication 81 MILLIGRAM(S): at 00:01

## 2017-12-10 RX ADMIN — Medication 81 MILLIGRAM(S): at 22:49

## 2017-12-10 RX ADMIN — Medication 650 MILLIGRAM(S): at 10:51

## 2017-12-10 NOTE — CONSULT NOTE ADULT - SUBJECTIVE AND OBJECTIVE BOX
[facility], THE HEART CENTER                                   [address] [place] [address]                                                      PHONE: [phone]                                                         FAX: [phone]  [email or web address]  ---------------------------------------------------------------------------------------------------------------------------------    Reason for Consult: MOHIT    HPI:  TESSIE RAND is an 52y Female with past medical history significant for HLD, DM, past tobacco use, CAD sp recent YUE to LCx with residual 90% stenosis of D2 who presents to the ER c/o intermittent continued midsternal chest pain that has continued since the stents were placed.  Over the past week the pain has been increased when taking a deep breath.      PAST MEDICAL & SURGICAL HISTORY:  Hyperlipidemia, unspecified hyperlipidemia type  Uterine leiomyoma, unspecified location  CAD (coronary artery disease)  DM (diabetes mellitus): tried nutritionist and diet change 2016   when  first  hgba1c  was elevated,  repeat is  11  to  f/u with md  Former smoker  H/O heart artery stent  Breast implant status: left breast implant  redone due to collapse of  original implant  History of tonsillectomy      erythromycin (Unknown)      MEDICATIONS  (STANDING):  aspirin  chewable 81 milliGRAM(s) Oral daily  atorvastatin 20 milliGRAM(s) Oral at bedtime  clopidogrel Tablet 75 milliGRAM(s) Oral daily  cyanocobalamin 1000 MICROGram(s) Oral daily  dextrose 5%. 1000 milliLiter(s) (50 mL/Hr) IV Continuous <Continuous>  dextrose 50% Injectable 12.5 Gram(s) IV Push once  dextrose 50% Injectable 25 Gram(s) IV Push once  dextrose 50% Injectable 25 Gram(s) IV Push once  enoxaparin Injectable 40 milliGRAM(s) SubCutaneous daily  influenza   Vaccine 0.5 milliLiter(s) IntraMuscular once  insulin lispro (HumaLOG) corrective regimen sliding scale   SubCutaneous three times a day before meals  metoprolol succinate ER 25 milliGRAM(s) Oral daily    MEDICATIONS  (PRN):  acetaminophen   Tablet 650 milliGRAM(s) Oral every 6 hours PRN Pain  dextrose Gel 1 Dose(s) Oral once PRN Blood Glucose LESS THAN 70 milliGRAM(s)/deciliter  glucagon  Injectable 1 milliGRAM(s) IntraMuscular once PRN Glucose LESS THAN 70 milligrams/deciliter      Social History:  Cigarettes:                    Alchohol:                 Illicit Drug Abuse:      REVIEW OF SYSTEMS:    Constitutional: No fever, weight loss or fatigue  Eyes: No eye pain, visual disturbances, or discharge  ENMT:  No difficulty hearing, tinnitus, vertigo; No sinus or throat pain  Neck: No pain or stiffness  Respiratory: No cough, wheezing, chills or hemoptysis  Cardiovascular: No chest pain, palpitations, shortness of breath, dizziness or leg swelling  Gastrointestinal: No abdominal or epigastric pain. No nausea, vomiting or hematemesis; No diarrhea or constipation. No melena or hematochezia.  Genitourinary: No dysuria, frequency, hematuria or incontinence  Rectal: No pain, hemorrhoids or incontinence  Neurological: No headaches, memory loss, loss of strength, numbness or tremors  Skin: No itching, burning, rashes or lesions   Lymph Nodes: No enlarged glands  Endocrine: No heat or cold intolerance; No hair loss  Musculoskeletal: No joint pain or swelling; No muscle, back or extremity pain  Psychiatric: No depression, anxiety, mood swings or difficulty sleeping  Heme/Lymph: No easy bruising or bleeding gums  Allergy and Immunologic: No hives or eczema    Vital Signs Last 24 Hrs  T(C): 36.6 (10 Dec 2017 00:30), Max: 36.7 (09 Dec 2017 17:56)  T(F): 97.8 (10 Dec 2017 00:30), Max: 98 (09 Dec 2017 17:56)  HR: 89 (10 Dec 2017 00:30) (65 - 105)  BP: 112/1 (10 Dec 2017 00:30) (112/1 - 138/71)  BP(mean): --  RR: 18 (10 Dec 2017 00:30) (18 - 18)  SpO2: 95% (10 Dec 2017 00:30) (95% - 100%)  ICU Vital Signs Last 24 Hrs  TESSIE RAND  I&O's Detail    I&O's Summary    Drug Dosing Weight  TESSIE RAND      PHYSICAL EXAM:  General: Appears well developed, well nourished alert and cooperative.  HEENT: Head; normocephalic, atraumatic.  Eyes: Pupils reactive, cornea wnl.  Neck: Supple, no nodes adenopathy, no NVD or carotid bruit or thyromegaly.  CARDIOVASCULAR: Normal S1 and S2, No murmur, rub, gallop or lift.   LUNGS: No rales, rhonchi or wheeze. Normal breath sounds bilaterally.  ABDOMEN: Soft, nontender without mass or organomegaly. bowel sounds normoactive.  EXTREMITIES: No clubbing, cyanosis or edema. Distal pulses wnl.   SKIN: warm and dry with normal turgor.  NEURO: Alert/oriented x 3/normal motor exam. No pathologic reflexes.    PSYCH: normal affect.        LABS:                        10.2   9.9   )-----------( 439      ( 10 Dec 2017 06:37 )             32.7     12-10    138  |  98  |  10.0  ----------------------------<  185<H>  4.0   |  24.0  |  0.39<L>    Ca    9.2      10 Dec 2017 06:37    TPro  7.9  /  Alb  3.8  /  TBili  0.5  /  DBili  x   /  AST  11  /  ALT  16  /  AlkPhos  80  12-10    TESSIE RAND  CARDIAC MARKERS ( 10 Dec 2017 06:37 )  x     / <0.01 ng/mL / x     / x     / x      CARDIAC MARKERS ( 09 Dec 2017 23:53 )  x     / <0.01 ng/mL / x     / x     / x      CARDIAC MARKERS ( 09 Dec 2017 14:40 )  x     / <0.01 ng/mL / 84 U/L / x     / x          PT/INR - ( 10 Dec 2017 06:37 )   PT: 13.7 sec;   INR: 1.24 ratio         PTT - ( 10 Dec 2017 06:37 )  PTT:32.9 sec      RADIOLOGY & ADDITIONAL STUDIES:    INTERPRETATION OF TELEMETRY (personally reviewed):    ECG:    ECHO: < from: TTE Echo w/Cont Complete (11.17.17 @ 21:27) >  Summary:   1. The left atrium is normal in size.   2. Normal wall motion. Left ventricular ejection fraction, by visual   estimation, is 60 to 65%.   3. The right atrium is normal in size.   4. Normal right ventricular size and function.   5. No significant valvular abnormality.   6. There is no evidence of pericardial effusion.     , Electronically signed on 11/17/2017 at 10:59:12 PM         < end of copied text >      STRESS TEST:    CARDIAC CATHETERIZATION:   < from: Cardiac Cath Lab - Adult (11.17.17 @ 17:11) >  CORONARY VESSELS: The coronary circulation is right dominant.  LM:   --  LM: Normal.  LAD:   --  Proximal LAD: There was a 20 % stenosis.  --  D1: There was a 25 % stenosis.  --  D2: There was a 90 % stenosis.  CX:   --  Mid circumflex: There was a 95 % stenosis.  --  OM1: There was a 95 % stenosis.  RCA:   --  RCA: Angiography showedminor luminal irregularities with no  flow limiting lesions.  --  RPDA: There was a 25 % stenosis.  COMPLICATIONS: There were no complications. No complications occurred  during the cath lab visit.  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  The LCX was treated with PTCA and STENT (YUE-Syngergy) with IVUS.  DIAGNOSTIC RECOMMENDATIONS: ASA and Brilinta If persistent symptom then PCI  of Dia2  INTERVENTIONAL IMPRESSIONS: Severe mid LCX disease and dia2 disease  (small). The LCX was treated with PTCA and STENT (YUE-Syngergy) with IVUS.  INTERVENTIONAL RECOMMENDATIONS: ASA and Brilinta If persistent symptom then  PCI of Dia2  Prepared and signed by  Rajat Minaya MD    < end of copied text >    ACTIVE PROBLEMS:  HEALTH ISSUES - PROBLEM Dx:          Assessment and Plan:    In summary, TESSIE RAND is an 52y Female with past medical history significant for     Telemetry monitoring  Serial cardiac markers and EKgs  2DEchocardiogram  Continue present cardiac therapy    KAYCEE JORGE MD, [facility], [facility]ARDIOVASCULAR [facility], THE HEART CENTER                                   [address] [place] [address]                                                      PHONE: [phone]                                                         FAX: [phone]  [email or web address]  ---------------------------------------------------------------------------------------------------------------------------------    Reason for Consult: SOB    HPI:  TESSIE RAND is an 52y Female with past medical history significant for HLD, DM, past tobacco use, CAD sp recent YUE to LCx with residual 90% stenosis of D2 who presents to the ER c/o  continued midsternal   Over the past week the pain has been increased when taking a deep breath. CTA of chest negative for PE      PAST MEDICAL & SURGICAL HISTORY:  Hyperlipidemia, unspecified hyperlipidemia type  Uterine leiomyoma, unspecified location  CAD (coronary artery disease)  DM (diabetes mellitus): tried nutritionist and diet change 2016   when  first  hgba1c  was elevated,  repeat is  11  to  f/u with md  Former smoker  H/O heart artery stent  Breast implant status: left breast implant  redone due to collapse of  original implant  History of tonsillectomy      erythromycin (Unknown)      MEDICATIONS  (STANDING):  aspirin  chewable 81 milliGRAM(s) Oral daily  atorvastatin 20 milliGRAM(s) Oral at bedtime  clopidogrel Tablet 75 milliGRAM(s) Oral daily  cyanocobalamin 1000 MICROGram(s) Oral daily  dextrose 5%. 1000 milliLiter(s) (50 mL/Hr) IV Continuous <Continuous>  dextrose 50% Injectable 12.5 Gram(s) IV Push once  dextrose 50% Injectable 25 Gram(s) IV Push once  dextrose 50% Injectable 25 Gram(s) IV Push once  enoxaparin Injectable 40 milliGRAM(s) SubCutaneous daily  influenza   Vaccine 0.5 milliLiter(s) IntraMuscular once  insulin lispro (HumaLOG) corrective regimen sliding scale   SubCutaneous three times a day before meals  metoprolol succinate ER 25 milliGRAM(s) Oral daily    MEDICATIONS  (PRN):  acetaminophen   Tablet 650 milliGRAM(s) Oral every 6 hours PRN Pain  dextrose Gel 1 Dose(s) Oral once PRN Blood Glucose LESS THAN 70 milliGRAM(s)/deciliter  glucagon  Injectable 1 milliGRAM(s) IntraMuscular once PRN Glucose LESS THAN 70 milligrams/deciliter      Social History:  Cigarettes:                    Alchohol:                 Illicit Drug Abuse:      REVIEW OF SYSTEMS:    Constitutional: No fever, weight loss or fatigue  Eyes: No eye pain, visual disturbances, or discharge  ENMT:  No difficulty hearing, tinnitus, vertigo; No sinus or throat pain  Neck: No pain or stiffness  Respiratory: No cough, wheezing, chills or hemoptysis  Cardiovascular: No chest pain, palpitations, shortness of breath, dizziness or leg swelling  Gastrointestinal: No abdominal or epigastric pain. No nausea, vomiting or hematemesis; No diarrhea or constipation. No melena or hematochezia.  Genitourinary: No dysuria, frequency, hematuria or incontinence  Rectal: No pain, hemorrhoids or incontinence  Neurological: No headaches, memory loss, loss of strength, numbness or tremors  Skin: No itching, burning, rashes or lesions   Lymph Nodes: No enlarged glands  Endocrine: No heat or cold intolerance; No hair loss  Musculoskeletal: No joint pain or swelling; No muscle, back or extremity pain  Psychiatric: No depression, anxiety, mood swings or difficulty sleeping  Heme/Lymph: No easy bruising or bleeding gums  Allergy and Immunologic: No hives or eczema    Vital Signs Last 24 Hrs  T(C): 36.6 (10 Dec 2017 00:30), Max: 36.7 (09 Dec 2017 17:56)  T(F): 97.8 (10 Dec 2017 00:30), Max: 98 (09 Dec 2017 17:56)  HR: 89 (10 Dec 2017 00:30) (65 - 105)  BP: 112/1 (10 Dec 2017 00:30) (112/1 - 138/71)  BP(mean): --  RR: 18 (10 Dec 2017 00:30) (18 - 18)  SpO2: 95% (10 Dec 2017 00:30) (95% - 100%)  ICU Vital Signs Last 24 Hrs  TESSIE RAND  I&O's Detail    I&O's Summary    Drug Dosing Weight  TESSIE RAND      PHYSICAL EXAM:  General: Appears well developed, well nourished alert and cooperative.  HEENT: Head; normocephalic, atraumatic.  Eyes: Pupils reactive, cornea wnl.  Neck: Supple, no nodes adenopathy, no NVD or carotid bruit or thyromegaly.  CARDIOVASCULAR: Normal S1 and S2, No murmur, rub, gallop or lift.   LUNGS: No rales, rhonchi or wheeze. Normal breath sounds bilaterally.  ABDOMEN: Soft, nontender without mass or organomegaly. bowel sounds normoactive.  EXTREMITIES: No clubbing, cyanosis or edema. Distal pulses wnl.   SKIN: warm and dry with normal turgor.  NEURO: Alert/oriented x 3/normal motor exam. No pathologic reflexes.    PSYCH: normal affect.        LABS:                        10.2   9.9   )-----------( 439      ( 10 Dec 2017 06:37 )             32.7     12-10    138  |  98  |  10.0  ----------------------------<  185<H>  4.0   |  24.0  |  0.39<L>    Ca    9.2      10 Dec 2017 06:37    TPro  7.9  /  Alb  3.8  /  TBili  0.5  /  DBili  x   /  AST  11  /  ALT  16  /  AlkPhos  80  12-10    TESSIE RAND  CARDIAC MARKERS ( 10 Dec 2017 06:37 )  x     / <0.01 ng/mL / x     / x     / x      CARDIAC MARKERS ( 09 Dec 2017 23:53 )  x     / <0.01 ng/mL / x     / x     / x      CARDIAC MARKERS ( 09 Dec 2017 14:40 )  x     / <0.01 ng/mL / 84 U/L / x     / x          PT/INR - ( 10 Dec 2017 06:37 )   PT: 13.7 sec;   INR: 1.24 ratio         PTT - ( 10 Dec 2017 06:37 )  PTT:32.9 sec      RADIOLOGY & ADDITIONAL STUDIES:    INTERPRETATION OF TELEMETRY (personally reviewed):    ECG:    ECHO: < from: TTE Echo w/Cont Complete (11.17.17 @ 21:27) >  Summary:   1. The left atrium is normal in size.   2. Normal wall motion. Left ventricular ejection fraction, by visual   estimation, is 60 to 65%.   3. The right atrium is normal in size.   4. Normal right ventricular size and function.   5. No significant valvular abnormality.   6. There is no evidence of pericardial effusion.     , Electronically signed on 11/17/2017 at 10:59:12 PM         < end of copied text >      STRESS TEST:    CARDIAC CATHETERIZATION:   < from: Cardiac Cath Lab - Adult (11.17.17 @ 17:11) >  CORONARY VESSELS: The coronary circulation is right dominant.  LM:   --  LM: Normal.  LAD:   --  Proximal LAD: There was a 20 % stenosis.  --  D1: There was a 25 % stenosis.  --  D2: There was a 90 % stenosis.  CX:   --  Mid circumflex: There was a 95 % stenosis.  --  OM1: There was a 95 % stenosis.  RCA:   --  RCA: Angiography showedminor luminal irregularities with no  flow limiting lesions.  --  RPDA: There was a 25 % stenosis.  COMPLICATIONS: There were no complications. No complications occurred  during the cath lab visit.  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  The LCX was treated with PTCA and STENT (YUE-Syngergy) with IVUS.  DIAGNOSTIC RECOMMENDATIONS: ASA and Brilinta If persistent symptom then PCI  of Dia2  INTERVENTIONAL IMPRESSIONS: Severe mid LCX disease and dia2 disease  (small). The LCX was treated with PTCA and STENT (YUE-Syngergy) with IVUS.  INTERVENTIONAL RECOMMENDATIONS: ASA and Brilinta If persistent symptom then  PCI of Dia2  Prepared and signed by  Rajat Minaya MD    < end of copied text >    ACTIVE PROBLEMS:  HEALTH ISSUES - PROBLEM Dx:          Assessment and Plan:    In summary, TESSIE RAND is an 52y Female with past medical history significant for     Telemetry monitoring  Serial cardiac markers and EKgs  Continue present cardiac therapy  May need recath if positive markers or persistent symptoms    KAYCEE JORGE MD, FACC, [facility], [facility]                                   [address] [place] [address]                                                      PHONE: [phone]                                                         FAX: [phone]  [email or web address]  ---------------------------------------------------------------------------------------------------------------------------------    Reason for Consult: SOB    HPI:  TESSIE RAND is an 52y Female with past medical history significant for HLD, DM, past tobacco use, CAD sp recent YUE to LCx with residual 90% stenosis of D2 who presents to the ER c/o  mild neck discomfort and SOB    Over the past week the pain has been increased when taking a deep breath. CTA of chest negative for PE      PAST MEDICAL & SURGICAL HISTORY:  Hyperlipidemia, unspecified hyperlipidemia type  Uterine leiomyoma, unspecified location  CAD (coronary artery disease)  DM (diabetes mellitus): tried nutritionist and diet change 2016   when  first  hgba1c  was elevated,  repeat is  11  to  f/u with md  Former smoker  H/O heart artery stent  Breast implant status: left breast implant  redone due to collapse of  original implant  History of tonsillectomy      erythromycin (Unknown)      MEDICATIONS  (STANDING):  aspirin  chewable 81 milliGRAM(s) Oral daily  atorvastatin 20 milliGRAM(s) Oral at bedtime  clopidogrel Tablet 75 milliGRAM(s) Oral daily  cyanocobalamin 1000 MICROGram(s) Oral daily  dextrose 5%. 1000 milliLiter(s) (50 mL/Hr) IV Continuous <Continuous>  dextrose 50% Injectable 12.5 Gram(s) IV Push once  dextrose 50% Injectable 25 Gram(s) IV Push once  dextrose 50% Injectable 25 Gram(s) IV Push once  enoxaparin Injectable 40 milliGRAM(s) SubCutaneous daily  influenza   Vaccine 0.5 milliLiter(s) IntraMuscular once  insulin lispro (HumaLOG) corrective regimen sliding scale   SubCutaneous three times a day before meals  metoprolol succinate ER 25 milliGRAM(s) Oral daily    MEDICATIONS  (PRN):  acetaminophen   Tablet 650 milliGRAM(s) Oral every 6 hours PRN Pain  dextrose Gel 1 Dose(s) Oral once PRN Blood Glucose LESS THAN 70 milliGRAM(s)/deciliter  glucagon  Injectable 1 milliGRAM(s) IntraMuscular once PRN Glucose LESS THAN 70 milligrams/deciliter      Social History:  Cigarettes:                    Alchohol:                 Illicit Drug Abuse:      REVIEW OF SYSTEMS:    Constitutional: No fever, weight loss or fatigue  Eyes: No eye pain, visual disturbances, or discharge  ENMT:  No difficulty hearing, tinnitus, vertigo; No sinus or throat pain  Neck: No pain or stiffness  Respiratory: No cough, wheezing, chills or hemoptysis  Cardiovascular: No chest pain, palpitations, shortness of breath, dizziness or leg swelling  Gastrointestinal: No abdominal or epigastric pain. No nausea, vomiting or hematemesis; No diarrhea or constipation. No melena or hematochezia.  Genitourinary: No dysuria, frequency, hematuria or incontinence  Rectal: No pain, hemorrhoids or incontinence  Neurological: No headaches, memory loss, loss of strength, numbness or tremors  Skin: No itching, burning, rashes or lesions   Lymph Nodes: No enlarged glands  Endocrine: No heat or cold intolerance; No hair loss  Musculoskeletal: No joint pain or swelling; No muscle, back or extremity pain  Psychiatric: No depression, anxiety, mood swings or difficulty sleeping  Heme/Lymph: No easy bruising or bleeding gums  Allergy and Immunologic: No hives or eczema    Vital Signs Last 24 Hrs  T(C): 36.6 (10 Dec 2017 00:30), Max: 36.7 (09 Dec 2017 17:56)  T(F): 97.8 (10 Dec 2017 00:30), Max: 98 (09 Dec 2017 17:56)  HR: 89 (10 Dec 2017 00:30) (65 - 105)  BP: 112/1 (10 Dec 2017 00:30) (112/1 - 138/71)  BP(mean): --  RR: 18 (10 Dec 2017 00:30) (18 - 18)  SpO2: 95% (10 Dec 2017 00:30) (95% - 100%)  ICU Vital Signs Last 24 Hrs  TESSIE RAND  I&O's Detail    I&O's Summary    Drug Dosing Weight  TESSIE RAND      PHYSICAL EXAM:  General: Appears well developed, well nourished alert and cooperative.  HEENT: Head; normocephalic, atraumatic.  Eyes: Pupils reactive, cornea wnl.  Neck: Supple, no nodes adenopathy, no NVD or carotid bruit or thyromegaly.  CARDIOVASCULAR: Normal S1 and S2, No murmur, rub, gallop or lift.   LUNGS: No rales, rhonchi or wheeze. Normal breath sounds bilaterally.  ABDOMEN: Soft, nontender without mass or organomegaly. bowel sounds normoactive.  EXTREMITIES: No clubbing, cyanosis or edema. Distal pulses wnl.   SKIN: warm and dry with normal turgor.  NEURO: Alert/oriented x 3/normal motor exam. No pathologic reflexes.    PSYCH: normal affect.        LABS:                        10.2   9.9   )-----------( 439      ( 10 Dec 2017 06:37 )             32.7     12-10    138  |  98  |  10.0  ----------------------------<  185<H>  4.0   |  24.0  |  0.39<L>    Ca    9.2      10 Dec 2017 06:37    TPro  7.9  /  Alb  3.8  /  TBili  0.5  /  DBili  x   /  AST  11  /  ALT  16  /  AlkPhos  80  12-10    TESSIE RAND  CARDIAC MARKERS ( 10 Dec 2017 06:37 )  x     / <0.01 ng/mL / x     / x     / x      CARDIAC MARKERS ( 09 Dec 2017 23:53 )  x     / <0.01 ng/mL / x     / x     / x      CARDIAC MARKERS ( 09 Dec 2017 14:40 )  x     / <0.01 ng/mL / 84 U/L / x     / x          PT/INR - ( 10 Dec 2017 06:37 )   PT: 13.7 sec;   INR: 1.24 ratio         PTT - ( 10 Dec 2017 06:37 )  PTT:32.9 sec      RADIOLOGY & ADDITIONAL STUDIES:    INTERPRETATION OF TELEMETRY (personally reviewed):    ECG:    ECHO: < from: TTE Echo w/Cont Complete (11.17.17 @ 21:27) >  Summary:   1. The left atrium is normal in size.   2. Normal wall motion. Left ventricular ejection fraction, by visual   estimation, is 60 to 65%.   3. The right atrium is normal in size.   4. Normal right ventricular size and function.   5. No significant valvular abnormality.   6. There is no evidence of pericardial effusion.     , Electronically signed on 11/17/2017 at 10:59:12 PM         < end of copied text >      STRESS TEST:    CARDIAC CATHETERIZATION:   < from: Cardiac Cath Lab - Adult (11.17.17 @ 17:11) >  CORONARY VESSELS: The coronary circulation is right dominant.  LM:   --  LM: Normal.  LAD:   --  Proximal LAD: There was a 20 % stenosis.  --  D1: There was a 25 % stenosis.  --  D2: There was a 90 % stenosis.  CX:   --  Mid circumflex: There was a 95 % stenosis.  --  OM1: There was a 95 % stenosis.  RCA:   --  RCA: Angiography showedminor luminal irregularities with no  flow limiting lesions.  --  RPDA: There was a 25 % stenosis.  COMPLICATIONS: There were no complications. No complications occurred  during the cath lab visit.  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  The LCX was treated with PTCA and STENT (YUE-Syngergy) with IVUS.  DIAGNOSTIC RECOMMENDATIONS: ASA and Brilinta If persistent symptom then PCI  of Dia2  INTERVENTIONAL IMPRESSIONS: Severe mid LCX disease and dia2 disease  (small). The LCX was treated with PTCA and STENT (YUE-Syngergy) with IVUS.  INTERVENTIONAL RECOMMENDATIONS: ASA and Brilinta If persistent symptom then  PCI of Dia2  Prepared and signed by  Rajat Minaya MD    < end of copied text >    ACTIVE PROBLEMS:  HEALTH ISSUES - PROBLEM Dx:          Assessment and Plan:      HPI:  TESSIE RAND is an 52y Female with past medical history significant for HLD, DM, past tobacco use, CAD sp recent YUE to LCx with residual 90% stenosis of D2 who presents to the ER c/o  mild neck discomfort and SOB    Over the past week the pain has been increased when taking a deep breath. CTA of chest negative for PE    Telemetry monitoring  Serial cardiac markers and EKgs  Continue present cardiac therapy  May need recath if positive markers or persistent symptoms, VS PET    KAYCEE JORGE MD, FACC, [facility]

## 2017-12-10 NOTE — PROGRESS NOTE ADULT - SUBJECTIVE AND OBJECTIVE BOX
SUBJECTIVE    REVIEW OF SYSTEMS    General: Not in any pain	    Skin/Breast: No rash  	  ENMT: No visual problems, no sore throat	    Respiratory and Thorax: No cough, No CP, No SOB  	  Cardiovascular: No CP, No palpitations    Gastrointestinal: No Abd pain, No N/V/D    Musculoskeletal: No Joint pain, No back pain	    Neurological: No headache    Psychiatric: No anxiety      OBJECTIVE    Vital Signs Last 24 Hrs  T(C): 36.7 (12-10-17 @ 09:52), Max: 36.7 (12-09-17 @ 17:56)  T(F): 98 (12-10-17 @ 09:52), Max: 98 (12-09-17 @ 17:56)  HR: 91 (12-10-17 @ 09:52) (89 - 100)  BP: 123/75 (12-10-17 @ 09:52) (112/1 - 138/71)  BP(mean): --  RR: 18 (12-10-17 @ 09:52) (18 - 18)  SpO2: 97% (12-10-17 @ 09:52) (95% - 100%)    PHYSICAL EXAM:    Constitutional: Not in any distress    Eyes: No conjunctival injection    ENMT: No oral lesions    Neck: No nodes, no adenopathy    Back: Straight, no defects    Respiratory: clear b/l    Cardiovascular: RRR, no murmur    Gastrointestinal: soft, NT, ND    Extremities: No edema, no erythema    Neurological: no focal deficit    Skin: No rash      MEDICATIONS  (STANDING):  aspirin  chewable 81 milliGRAM(s) Oral daily  atorvastatin 20 milliGRAM(s) Oral at bedtime  clopidogrel Tablet 75 milliGRAM(s) Oral daily  cyanocobalamin 1000 MICROGram(s) Oral daily  dextrose 5%. 1000 milliLiter(s) (50 mL/Hr) IV Continuous <Continuous>  dextrose 50% Injectable 12.5 Gram(s) IV Push once  dextrose 50% Injectable 25 Gram(s) IV Push once  dextrose 50% Injectable 25 Gram(s) IV Push once  enoxaparin Injectable 40 milliGRAM(s) SubCutaneous daily  influenza   Vaccine 0.5 milliLiter(s) IntraMuscular once  insulin lispro (HumaLOG) corrective regimen sliding scale   SubCutaneous three times a day before meals  metoprolol succinate ER 25 milliGRAM(s) Oral daily    MEDICATIONS  (PRN):  acetaminophen   Tablet 650 milliGRAM(s) Oral every 6 hours PRN Pain  dextrose Gel 1 Dose(s) Oral once PRN Blood Glucose LESS THAN 70 milliGRAM(s)/deciliter  glucagon  Injectable 1 milliGRAM(s) IntraMuscular once PRN Glucose LESS THAN 70 milligrams/deciliter    CARDIAC MARKERS ( 10 Dec 2017 06:37 )  x     / <0.01 ng/mL / x     / x     / x      CARDIAC MARKERS ( 09 Dec 2017 23:53 )  x     / <0.01 ng/mL / x     / x     / x      CARDIAC MARKERS ( 09 Dec 2017 14:40 )  x     / <0.01 ng/mL / 84 U/L / x     / x                                10.2   9.9   )-----------( 439      ( 10 Dec 2017 06:37 )             32.7     10 Dec 2017 06:37    138    |  98     |  10.0   ----------------------------<  185    4.0     |  24.0   |  0.39     Ca    9.2        10 Dec 2017 06:37    TPro  7.9    /  Alb  3.8    /  TBili  0.5    /  DBili  x      /  AST  11     /  ALT  16     /  AlkPhos  80     10 Dec 2017 06:37    Allergies    erythromycin (Unknown)    Intolerances

## 2017-12-11 ENCOUNTER — APPOINTMENT (OUTPATIENT)
Dept: PULMONOLOGY | Facility: CLINIC | Age: 52
End: 2017-12-11

## 2017-12-11 LAB
ERYTHROCYTE [SEDIMENTATION RATE] IN BLOOD: 55 MM/HR — HIGH (ref 0–20)
GLUCOSE BLDC GLUCOMTR-MCNC: 145 MG/DL — HIGH (ref 70–99)
GLUCOSE BLDC GLUCOMTR-MCNC: 159 MG/DL — HIGH (ref 70–99)
GLUCOSE BLDC GLUCOMTR-MCNC: 165 MG/DL — HIGH (ref 70–99)
GLUCOSE BLDC GLUCOMTR-MCNC: 194 MG/DL — HIGH (ref 70–99)

## 2017-12-11 PROCEDURE — 99223 1ST HOSP IP/OBS HIGH 75: CPT

## 2017-12-11 PROCEDURE — 93306 TTE W/DOPPLER COMPLETE: CPT | Mod: 26

## 2017-12-11 RX ADMIN — Medication 81 MILLIGRAM(S): at 22:30

## 2017-12-11 RX ADMIN — Medication 25 MILLIGRAM(S): at 21:11

## 2017-12-11 RX ADMIN — Medication 650 MILLIGRAM(S): at 19:57

## 2017-12-11 RX ADMIN — Medication 650 MILLIGRAM(S): at 12:56

## 2017-12-11 RX ADMIN — ATORVASTATIN CALCIUM 20 MILLIGRAM(S): 80 TABLET, FILM COATED ORAL at 22:30

## 2017-12-11 RX ADMIN — CLOPIDOGREL BISULFATE 75 MILLIGRAM(S): 75 TABLET, FILM COATED ORAL at 18:44

## 2017-12-11 NOTE — PROGRESS NOTE ADULT - SUBJECTIVE AND OBJECTIVE BOX
[facility]                                   [address] [address]                                                      PHONE: [phone]                                                         FAX: [phone]  ----------------------------------------------------------------------------------------------------    Pt seen and examined. FU for CAD    Overnight events/Complaints: Pt without complains. Ambulated in the hallway 10 times without any further shortness of breath.    Vital Signs Last 24 Hrs  T(C): 37.1 (11 Dec 2017 08:14), Max: 37.5 (10 Dec 2017 23:54)  T(F): 98.7 (11 Dec 2017 08:14), Max: 99.5 (10 Dec 2017 23:54)  HR: 94 (11 Dec 2017 08:14) (86 - 96)  BP: 118/77 (10 Dec 2017 23:54) (103/71 - 118/77)  BP(mean): --  RR: 18 (11 Dec 2017 08:14) (18 - 18)  SpO2: 95% (11 Dec 2017 08:14) (95% - 96%)  I&O's Summary      PHYSICAL EXAM:  Constitutional: Appears well developed, well nourished; alert and co-operative  HEENT:     Head: Normocephalic and atraumatic  Eyes: Conjunctiva normal, No scleral icterus  Mouth/Throat: Mucous membranes are normal. Mucosa are not pale or dry.  Cardiovascular: Normal S1 S2, No murmurs  Respiratory: Lungs clear to auscultation; no crepitations, no wheeze  Gastrointestinal:  Soft, Non-tender, + BS	  Musculoskeletal: Normal range of motion. No edema  Skin: Warm and dry. No cyanosis . No diaphoresis.  Neurologic: Alert oriented to time place and person. Normal strength and no tremor.  Psychiatric: Normal mood and affect, Speech is normal and behavior is normal.        LABS:                        10.2   9.9   )-----------( 439      ( 10 Dec 2017 06:37 )             32.7     12-10    138  |  98  |  10.0  ----------------------------<  185<H>  4.0   |  24.0  |  0.39<L>    Ca    9.2      10 Dec 2017 06:37    TPro  7.9  /  Alb  3.8  /  TBili  0.5  /  DBili  x   /  AST  11  /  ALT  16  /  AlkPhos  80  12-10    CARDIAC MARKERS ( 10 Dec 2017 06:37 )  x     / <0.01 ng/mL / x     / x     / x      CARDIAC MARKERS ( 09 Dec 2017 23:53 )  x     / <0.01 ng/mL / x     / x     / x      CARDIAC MARKERS ( 09 Dec 2017 14:40 )  x     / <0.01 ng/mL / 84 U/L / x     / x          PT/INR - ( 10 Dec 2017 06:37 )   PT: 13.7 sec;   INR: 1.24 ratio         PTT - ( 10 Dec 2017 06:37 )  PTT:32.9 sec    RADIOLOGY & ADDITIONAL STUDIES:    CARDIOLOGY TESTING:     Telemetry: No arrhythmias    Cardiac Catheterization:  < from: Cardiac Cath Lab - Adult (11.17.17 @ 17:11) >  VENTRICLES: LVEF 55%  CORONARY VESSELS: The coronary circulation is right dominant.  LM:   --  LM: Normal.  LAD:   --  Proximal LAD: There was a 20 % stenosis.  --  D1: There was a 25 % stenosis.  --  D2: There was a 90 % stenosis.  CX:   --  Mid circumflex: There was a 95 % stenosis.  --  OM1: There was a 95 % stenosis.  RCA:   --  RCA: Angiography showedminor luminal irregularities with no  flow limiting lesions.  --  RPDA: There was a 25 % stenosis.  COMPLICATIONS: There were no complications. No complications occurred  during the cath lab visit.  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  The LCX was treated with PTCA and STENT (YUE-Syngergy) with IVUS.  DIAGNOSTIC RECOMMENDATIONS: ASA and Brilinta If persistent symptom then PCI  of Dia2  INTERVENTIONAL IMPRESSIONS: Severe mid LCX disease and dia2 disease  (small). The LCX was treated with PTCA and STENT (YUE-Syngergy) with IVUS.  INTERVENTIONAL RECOMMENDATIONS: ASA and Brilinta If persistent symptom then  PCI of Dia2    < end of copied text >    MEDICATIONS:  MEDICATIONS  (STANDING):  aspirin  chewable 81 milliGRAM(s) Oral daily  atorvastatin 20 milliGRAM(s) Oral at bedtime  clopidogrel Tablet 75 milliGRAM(s) Oral daily  cyanocobalamin 1000 MICROGram(s) Oral daily  dextrose 5%. 1000 milliLiter(s) (50 mL/Hr) IV Continuous <Continuous>  dextrose 50% Injectable 12.5 Gram(s) IV Push once  dextrose 50% Injectable 25 Gram(s) IV Push once  dextrose 50% Injectable 25 Gram(s) IV Push once  enoxaparin Injectable 40 milliGRAM(s) SubCutaneous daily  influenza   Vaccine 0.5 milliLiter(s) IntraMuscular once  insulin lispro (HumaLOG) corrective regimen sliding scale   SubCutaneous three times a day before meals  metoprolol succinate ER 25 milliGRAM(s) Oral daily    MEDICATIONS  (PRN):  acetaminophen   Tablet 650 milliGRAM(s) Oral every 6 hours PRN Pain  dextrose Gel 1 Dose(s) Oral once PRN Blood Glucose LESS THAN 70 milliGRAM(s)/deciliter  glucagon  Injectable 1 milliGRAM(s) IntraMuscular once PRN Glucose LESS THAN 70 milligrams/deciliter      ASSESSMENT AND PLAN:    52y Female with past medical history significant for HLD, DM, past tobacco use, CAD sp recent YUE to LCx with residual 90% stenosis of D2, recent admission for ischemic colitis presented with shortness of breath. CT with no PE did show small pericardial effusion.    -  Tolerating Toprol well without any side effects.   -  Continue ASA, Plavix and statin  -  Reinforced compliance with DAPT  -  Will check Echo to assess LV function and status of pericardial effusion  -  If echo without abnormalities, no further in-pt cardiac work-up needed. Will arrange outpt FU post discharge.

## 2017-12-11 NOTE — PROGRESS NOTE ADULT - PROBLEM SELECTOR PLAN 1
for cardiac eval; may need eventual cath
echo shows elev CVP, will get BNP; ESR elevated - continues to experience chest pain on inspiration

## 2017-12-11 NOTE — CONSULT NOTE ADULT - ASSESSMENT
Patient without evidence of significant pulmonary issues though can't rule out some degree of airway dysfunction though clearly not an etiology of complaints related to pain.  There is some evidence of pericardial disease which would explain patient's symptoms to some degree though there is certainly no clinical evidence of functional pericardial disease i.e. tamponade/restriction.  Dyspnea I suspect is more likely related to some element of deconditioning /detraining.  ?Pericarditis    Plan:  1.Echo  2.ESR  3.Outpatient PFT  4.As per cardiology  5.Likely can be d/c'd in next 24 hours if stable.

## 2017-12-11 NOTE — PROGRESS NOTE ADULT - SUBJECTIVE AND OBJECTIVE BOX
SUBJECTIVE    REVIEW OF SYSTEMS    General: Not in any pain	    Skin/Breast: No rash  	  ENMT: No visual problems, no sore throat	    Respiratory and Thorax: No cough, No CP, No SOB  	  Cardiovascular: No CP, No palpitations    Gastrointestinal: No Abd pain, No N/V/D    Musculoskeletal: No Joint pain, No back pain	    Neurological: No headache    Psychiatric: No anxiety      OBJECTIVE    Vital Signs Last 24 Hrs  T(C): 36.5 (12-11-17 @ 15:35), Max: 37.5 (12-10-17 @ 23:54)  T(F): 97.7 (12-11-17 @ 15:35), Max: 99.5 (12-10-17 @ 23:54)  HR: 93 (12-11-17 @ 15:35) (86 - 94)  BP: 110/69 (12-11-17 @ 15:35) (110/69 - 118/77)  BP(mean): --  RR: 18 (12-11-17 @ 15:35) (18 - 18)  SpO2: 97% (12-11-17 @ 15:35) (95% - 97%)    PHYSICAL EXAM:    Constitutional: Not in any distress    Eyes: No conjunctival injection    ENMT: No oral lesions    Neck: No nodes, no adenopathy    Back: Straight, no defects    Respiratory: clear b/l    Cardiovascular: RRR, no murmur    Gastrointestinal: soft, NT, ND    Extremities: No edema, no erythema    Neurological: no focal deficit    Skin: No rash      MEDICATIONS  (STANDING):  aspirin  chewable 81 milliGRAM(s) Oral daily  atorvastatin 20 milliGRAM(s) Oral at bedtime  clopidogrel Tablet 75 milliGRAM(s) Oral daily  cyanocobalamin 1000 MICROGram(s) Oral daily  dextrose 5%. 1000 milliLiter(s) (50 mL/Hr) IV Continuous <Continuous>  dextrose 50% Injectable 12.5 Gram(s) IV Push once  dextrose 50% Injectable 25 Gram(s) IV Push once  dextrose 50% Injectable 25 Gram(s) IV Push once  enoxaparin Injectable 40 milliGRAM(s) SubCutaneous daily  influenza   Vaccine 0.5 milliLiter(s) IntraMuscular once  insulin lispro (HumaLOG) corrective regimen sliding scale   SubCutaneous three times a day before meals  metoprolol succinate ER 25 milliGRAM(s) Oral daily    MEDICATIONS  (PRN):  acetaminophen   Tablet 650 milliGRAM(s) Oral every 6 hours PRN Pain  dextrose Gel 1 Dose(s) Oral once PRN Blood Glucose LESS THAN 70 milliGRAM(s)/deciliter  glucagon  Injectable 1 milliGRAM(s) IntraMuscular once PRN Glucose LESS THAN 70 milligrams/deciliter    CARDIAC MARKERS ( 10 Dec 2017 06:37 )  x     / <0.01 ng/mL / x     / x     / x      CARDIAC MARKERS ( 09 Dec 2017 23:53 )  x     / <0.01 ng/mL / x     / x     / x                                10.2   9.9   )-----------( 439      ( 10 Dec 2017 06:37 )             32.7     10 Dec 2017 06:37    138    |  98     |  10.0   ----------------------------<  185    4.0     |  24.0   |  0.39     Ca    9.2        10 Dec 2017 06:37    TPro  7.9    /  Alb  3.8    /  TBili  0.5    /  DBili  x      /  AST  11     /  ALT  16     /  AlkPhos  80     10 Dec 2017 06:37    Allergies    erythromycin (Unknown)    Intolerances

## 2017-12-11 NOTE — CONSULT NOTE ADULT - SUBJECTIVE AND OBJECTIVE BOX
PULMONARY CONSULT NOTE      RIZWANA RAND-[id number]    Patient is a 52y old  Female who presents with a chief complaint of SOB and chest pain (09 Dec 2017 18:41)  Patient s/p stent x 2 end of November.  Complicated by colitis and GIB thereafter.  Presents to hospital this admission with persistent dyspnea (but not necessarily new), anterior chest pressure/pain and right neck pain.  It is somewhat related to respiration.  CTA reviewed: no PE, no significant pulmonary anatomical dz but there is evidence of pericardial disease on scan of unclear significance.  Distant smoking history of 1/2 PPD stopped 10 years ago.  No cough, no sputum, no wheeze.      INTERVAL HPI/OVERNIGHT EVENTS:    MEDICATIONS  (STANDING):  aspirin  chewable 81 milliGRAM(s) Oral daily  atorvastatin 20 milliGRAM(s) Oral at bedtime  clopidogrel Tablet 75 milliGRAM(s) Oral daily  cyanocobalamin 1000 MICROGram(s) Oral daily  dextrose 5%. 1000 milliLiter(s) (50 mL/Hr) IV Continuous <Continuous>  dextrose 50% Injectable 12.5 Gram(s) IV Push once  dextrose 50% Injectable 25 Gram(s) IV Push once  dextrose 50% Injectable 25 Gram(s) IV Push once  enoxaparin Injectable 40 milliGRAM(s) SubCutaneous daily  influenza   Vaccine 0.5 milliLiter(s) IntraMuscular once  insulin lispro (HumaLOG) corrective regimen sliding scale   SubCutaneous three times a day before meals  metoprolol succinate ER 25 milliGRAM(s) Oral daily      MEDICATIONS  (PRN):  acetaminophen   Tablet 650 milliGRAM(s) Oral every 6 hours PRN Pain  dextrose Gel 1 Dose(s) Oral once PRN Blood Glucose LESS THAN 70 milliGRAM(s)/deciliter  glucagon  Injectable 1 milliGRAM(s) IntraMuscular once PRN Glucose LESS THAN 70 milligrams/deciliter      Allergies    erythromycin (Unknown)    Intolerances        PAST MEDICAL & SURGICAL HISTORY:  Hyperlipidemia, unspecified hyperlipidemia type  Uterine leiomyoma, unspecified location  CAD (coronary artery disease)  DM (diabetes mellitus): tried nutritionist and diet change 2016   when  first  hgba1c  was elevated,  repeat is  11  to  f/u with md  Former smoker  H/O heart artery stent  Breast implant status: left breast implant  redone due to collapse of  original implant  History of tonsillectomy      FAMILY HISTORY:  Family history of atrial fibrillation (Father)      SOCIAL HISTORY  Smoking History: Per HPI    REVIEW OF SYSTEMS:    CONSTITUTIONAL:  As per HPI.    HEENT:  Eyes:  No diplopia or blurred vision. ENT:  No earache, sore throat or runny nose.    CARDIOVASCULAR: Per HPI    RESPIRATORY:  Per HPI    GASTROINTESTINAL:  No nausea, vomiting or diarrhea.    GENITOURINARY:  No dysuria, frequency or urgency.    MUSCULOSKELETAL:  No joint pains    SKIN:  No new lesions.    NEUROLOGIC:  No paresthesias, fasciculations, seizures or weakness.    PSYCHIATRIC:  No disorder of thought or mood.    ENDOCRINE:  No heat or cold intolerance, polyuria or polydipsia.    HEMATOLOGICAL:  No easy bruising or bleeding.     Vital Signs Last 24 Hrs  T(C): 37.1 (11 Dec 2017 08:14), Max: 37.5 (10 Dec 2017 23:54)  T(F): 98.7 (11 Dec 2017 08:14), Max: 99.5 (10 Dec 2017 23:54)  HR: 94 (11 Dec 2017 08:14) (86 - 96)  BP: 118/77 (10 Dec 2017 23:54) (103/71 - 118/77)  BP(mean): --  RR: 18 (11 Dec 2017 08:14) (18 - 18)  SpO2: 95% (11 Dec 2017 08:14) (95% - 96%)    PHYSICAL EXAMINATION:    GENERAL: The patient is a well-developed, well-nourished _____in no apparent distress.     HEENT: Head is normocephalic and atraumatic. Extraocular muscles are intact. Mucous membranes are moist.     NECK: Supple.     LUNGS: Clear to auscultation without wheezing, rales, or rhonchi. Respirations unlabored    HEART: Regular rate and rhythm without murmur.    ABDOMEN: Soft, nontender, and nondistended.  No hepatosplenomegaly is noted.    EXTREMITIES: Without any cyanosis, clubbing, rash, lesions or edema.    NEUROLOGIC: Grossly intact.    SKIN: No ulceration or induration present.      LABS:                        10.2   9.9   )-----------( 439      ( 10 Dec 2017 06:37 )             32.7     12-10    138  |  98  |  10.0  ----------------------------<  185<H>  4.0   |  24.0  |  0.39<L>    Ca    9.2      10 Dec 2017 06:37    TPro  7.9  /  Alb  3.8  /  TBili  0.5  /  DBili  x   /  AST  11  /  ALT  16  /  AlkPhos  80  12-10    PT/INR - ( 10 Dec 2017 06:37 )   PT: 13.7 sec;   INR: 1.24 ratio         PTT - ( 10 Dec 2017 06:37 )  PTT:32.9 sec      CARDIAC MARKERS ( 10 Dec 2017 06:37 )  x     / <0.01 ng/mL / x     / x     / x      CARDIAC MARKERS ( 09 Dec 2017 23:53 )  x     / <0.01 ng/mL / x     / x     / x      CARDIAC MARKERS ( 09 Dec 2017 14:40 )  x     / <0.01 ng/mL / 84 U/L / x     / x                    MICROBIOLOGY:    RADIOLOGY & ADDITIONAL STUDIES:< from: CT Angio Chest w/ IV Cont (12.09.17 @ 17:10) >     EXAM:  CT ANGIO CHEST (W)AW IC                          PROCEDURE DATE:  12/09/2017          INTERPRETATION:  CLINICAL INFORMATION: Pleuritic chest pain and elevated   d-dimer.    COMPARISON: None.    PROCEDURE:   CT Angiography of the Chest.  Intravenous contrast: 67 mL Omnipaque 350.  Sagittal and coronal reformats were performed as well as MIPS.    FINDINGS:    LUNGS AND LARGE AIRWAYS: Compressive atelectasis in the right lower lobe.  PLEURA: Small right pleural effusion.  VESSELS: Adequatecontrast opacification of the pulmonary arteries which   are visualized to the level of the segmental arteries. No pulmonary   embolism.   HEART: Heart size is normal. Small pericardial effusion.  MEDIASTINUM AND DAVID: No lymphadenopathy.  CHEST WALLAND LOWER NECK: Left breast implant.  VISUALIZED UPPER ABDOMEN: Within normal limits.  BONES: Degenerative changes of the spine.    IMPRESSION: No pulmonary embolism.      < end of copied text >

## 2017-12-12 ENCOUNTER — TRANSCRIPTION ENCOUNTER (OUTPATIENT)
Age: 52
End: 2017-12-12

## 2017-12-12 VITALS
HEART RATE: 80 BPM | DIASTOLIC BLOOD PRESSURE: 69 MMHG | OXYGEN SATURATION: 98 % | RESPIRATION RATE: 18 BRPM | SYSTOLIC BLOOD PRESSURE: 104 MMHG | TEMPERATURE: 99 F

## 2017-12-12 LAB
GLUCOSE BLDC GLUCOMTR-MCNC: 129 MG/DL — HIGH (ref 70–99)
GLUCOSE BLDC GLUCOMTR-MCNC: 191 MG/DL — HIGH (ref 70–99)
NT-PROBNP SERPL-SCNC: 353 PG/ML — HIGH (ref 0–300)

## 2017-12-12 PROCEDURE — 82962 GLUCOSE BLOOD TEST: CPT

## 2017-12-12 PROCEDURE — 93306 TTE W/DOPPLER COMPLETE: CPT

## 2017-12-12 PROCEDURE — 85730 THROMBOPLASTIN TIME PARTIAL: CPT

## 2017-12-12 PROCEDURE — 71046 X-RAY EXAM CHEST 2 VIEWS: CPT

## 2017-12-12 PROCEDURE — G0378: CPT

## 2017-12-12 PROCEDURE — 85027 COMPLETE CBC AUTOMATED: CPT

## 2017-12-12 PROCEDURE — 85379 FIBRIN DEGRADATION QUANT: CPT

## 2017-12-12 PROCEDURE — 85610 PROTHROMBIN TIME: CPT

## 2017-12-12 PROCEDURE — 83880 ASSAY OF NATRIURETIC PEPTIDE: CPT

## 2017-12-12 PROCEDURE — 84484 ASSAY OF TROPONIN QUANT: CPT

## 2017-12-12 PROCEDURE — 80061 LIPID PANEL: CPT

## 2017-12-12 PROCEDURE — 99285 EMERGENCY DEPT VISIT HI MDM: CPT | Mod: 25

## 2017-12-12 PROCEDURE — 80053 COMPREHEN METABOLIC PANEL: CPT

## 2017-12-12 PROCEDURE — 83036 HEMOGLOBIN GLYCOSYLATED A1C: CPT

## 2017-12-12 PROCEDURE — 85652 RBC SED RATE AUTOMATED: CPT

## 2017-12-12 PROCEDURE — 82550 ASSAY OF CK (CPK): CPT

## 2017-12-12 PROCEDURE — 71275 CT ANGIOGRAPHY CHEST: CPT

## 2017-12-12 PROCEDURE — 36415 COLL VENOUS BLD VENIPUNCTURE: CPT

## 2017-12-12 RX ADMIN — Medication 650 MILLIGRAM(S): at 06:14

## 2017-12-12 NOTE — PROGRESS NOTE ADULT - SUBJECTIVE AND OBJECTIVE BOX
[facility], THE HEART CENTER                                   [address] [place] [address]                                                      PHONE: [phone]                                                         FAX: [phone]  [email or web address]  ---------------------------------------------------------------------------------------------------------------------------------    Overnight events/patient complaints: No acute concerns     Patient is s/p TTE with normal biventricular function and no regional wall motion abnormality    < from: TTE Echo Complete w/Doppler (12.11.17 @ 14:03) >   1. Normal biventricular systolic function. Left ventricular ejection fraction, by visual estimation, is 60 to 65%.   2. No significant valvular abnormalities.   3. Elevated central venous pressure.   4. Trivial pericardial effusion.   5. ** Compared to TTE dated 11/17/2017, central venous pressures appear elevated.    MEDICATIONS  (STANDING):  aspirin  chewable 81 milliGRAM(s) Oral daily  atorvastatin 20 milliGRAM(s) Oral at bedtime  clopidogrel Tablet 75 milliGRAM(s) Oral daily  cyanocobalamin 1000 MICROGram(s) Oral daily  dextrose 5%. 1000 milliLiter(s) (50 mL/Hr) IV Continuous <Continuous>  dextrose 50% Injectable 12.5 Gram(s) IV Push once  dextrose 50% Injectable 25 Gram(s) IV Push once  dextrose 50% Injectable 25 Gram(s) IV Push once  enoxaparin Injectable 40 milliGRAM(s) SubCutaneous daily  influenza   Vaccine 0.5 milliLiter(s) IntraMuscular once  insulin lispro (HumaLOG) corrective regimen sliding scale   SubCutaneous three times a day before meals  metoprolol succinate ER 25 milliGRAM(s) Oral daily    MEDICATIONS  (PRN):  acetaminophen   Tablet 650 milliGRAM(s) Oral every 6 hours PRN Pain  dextrose Gel 1 Dose(s) Oral once PRN Blood Glucose LESS THAN 70 milliGRAM(s)/deciliter  glucagon  Injectable 1 milliGRAM(s) IntraMuscular once PRN Glucose LESS THAN 70 milligrams/deciliter      Vital Signs Last 24 Hrs  T(C): 36.7 (12 Dec 2017 08:30), Max: 37 (12 Dec 2017 00:21)  T(F): 98.1 (12 Dec 2017 08:30), Max: 98.6 (12 Dec 2017 00:21)  HR: 88 (12 Dec 2017 08:30) (88 - 93)  BP: 107/73 (12 Dec 2017 08:30) (107/73 - 110/69)  BP(mean): --  RR: 18 (12 Dec 2017 08:30) (18 - 18)  SpO2: 96% (12 Dec 2017 08:30) (96% - 98%)  ICU Vital Signs Last 24 Hrs  TESSIE RAND  I&O's Detail    11 Dec 2017 07:01  -  12 Dec 2017 07:00  --------------------------------------------------------  IN:    Oral Fluid: 480 mL  Total IN: 480 mL    OUT:  Total OUT: 0 mL    Total NET: 480 mL        I&O's Summary    11 Dec 2017 07:01  -  12 Dec 2017 07:00  --------------------------------------------------------  IN: 480 mL / OUT: 0 mL / NET: 480 mL      Drug Dosing Weight  TESSIE GIORGI      PHYSICAL EXAM:  Constitutional: Appears well developed, well nourished; alert and co-operative  HEENT:     Head: Normocephalic and atraumatic  Eyes: Conjunctiva normal, No scleral icterus  Mouth/Throat: Mucous membranes are normal. Mucosa are not pale or dry.  Cardiovascular: Normal S1 S2, No murmurs  Respiratory: Lungs clear to auscultation; no crepitations, no wheeze  Gastrointestinal:  Soft, Non-tender, + BS	  Musculoskeletal: Normal range of motion. No edema  Skin: Warm and dry. No cyanosis . No diaphoresis.  Neurologic: Alert oriented to time place and person. Normal strength and no tremor.  Psychiatric: Normal mood and affect, Speech is normal and behavior is normal.    LABS:    RADIOLOGY & ADDITIONAL STUDIES:    INTERPRETATION OF TELEMETRY (personally reviewed): no significant arrhythmia     ECG: < from: 12 Lead ECG (11.22.17 @ 09:32) >  Normal sinus rhythm  Nonspecific T wave abnormality  Abnormal ECG    LHC: VENTRICLES: LVEF 55%  CORONARY VESSELS: The coronary circulation is right dominant.  LM:   --  LM: Normal.  LAD:   --  Proximal LAD: There was a 20 % stenosis.  --  D1: There was a 25 % stenosis.  --  D2: There was a 90 % stenosis.  CX:   --  Mid circumflex: There was a 95 % stenosis.  --  OM1: There was a 95 % stenosis.  RCA:   --  RCA: Angiography showedminor luminal irregularities with no flow limiting lesions.  --  RPDA: There was a 25 % stenosis.  COMPLICATIONS: There were no complications. No complications occurred during the cath lab visit.  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  The LCX was treated with PTCA and STENT (YUE-Syngergy) with IVUS.  DIAGNOSTIC RECOMMENDATIONS: ASA and Brilinta If persistent symptom then PCI of Dia2  INTERVENTIONAL IMPRESSIONS: Severe mid LCX disease and dia2 disease  (small). The LCX was treated with PTCA and STENT (YUE-Syngergy) with IVUS.  INTERVENTIONAL RECOMMENDATIONS: ASA and Brilinta If persistent symptom then PCI of Dia2    ASSESSMENT AND PLAN:  In summary, TESSIE RAND is an 52y Female with past medical history significant for HLD, DM, past tobacco use, CAD sp recent YUE to LCx with residual 90% stenosis of D2, recent admission for ischemic colitis presented with shortness of breath. CT with no PE did show small pericardial effusion.    -  Tolerating Toprol well without any side effects.   -  Continue ASA, Plavix and statin  -  Reinforced compliance with DAPT  - TTE with normal biventricular function and no regional wall motion abnormality  - Patient can follow-up as outpatient for further risk stratification, and is currently medically optimized     Thank you for allowing [facility] to participate in the care of this patient.  Please feel free to call with any questions or concerns. [facility], THE HEART CENTER                                   [address] [place] [address]                                                      PHONE: [phone]                                                         FAX: [phone]  [email or web address]  ---------------------------------------------------------------------------------------------------------------------------------    Overnight events/patient complaints: Patient is very anxious, reviewed results of her echocardiogram and all questions answered. She denies chest pain at rest and with ambulation     Patient is s/p TTE with normal biventricular function and no regional wall motion abnormality    < from: TTE Echo Complete w/Doppler (12.11.17 @ 14:03) >   1. Normal biventricular systolic function. Left ventricular ejection fraction, by visual estimation, is 60 to 65%.   2. No significant valvular abnormalities.   3. Elevated central venous pressure.   4. Trivial pericardial effusion.   5. ** Compared to TTE dated 11/17/2017, central venous pressures appear elevated.    MEDICATIONS  (STANDING):  aspirin  chewable 81 milliGRAM(s) Oral daily  atorvastatin 20 milliGRAM(s) Oral at bedtime  clopidogrel Tablet 75 milliGRAM(s) Oral daily  cyanocobalamin 1000 MICROGram(s) Oral daily  dextrose 5%. 1000 milliLiter(s) (50 mL/Hr) IV Continuous <Continuous>  dextrose 50% Injectable 12.5 Gram(s) IV Push once  dextrose 50% Injectable 25 Gram(s) IV Push once  dextrose 50% Injectable 25 Gram(s) IV Push once  enoxaparin Injectable 40 milliGRAM(s) SubCutaneous daily  influenza   Vaccine 0.5 milliLiter(s) IntraMuscular once  insulin lispro (HumaLOG) corrective regimen sliding scale   SubCutaneous three times a day before meals  metoprolol succinate ER 25 milliGRAM(s) Oral daily    MEDICATIONS  (PRN):  acetaminophen   Tablet 650 milliGRAM(s) Oral every 6 hours PRN Pain  dextrose Gel 1 Dose(s) Oral once PRN Blood Glucose LESS THAN 70 milliGRAM(s)/deciliter  glucagon  Injectable 1 milliGRAM(s) IntraMuscular once PRN Glucose LESS THAN 70 milligrams/deciliter      Vital Signs Last 24 Hrs  T(C): 36.7 (12 Dec 2017 08:30), Max: 37 (12 Dec 2017 00:21)  T(F): 98.1 (12 Dec 2017 08:30), Max: 98.6 (12 Dec 2017 00:21)  HR: 88 (12 Dec 2017 08:30) (88 - 93)  BP: 107/73 (12 Dec 2017 08:30) (107/73 - 110/69)  BP(mean): --  RR: 18 (12 Dec 2017 08:30) (18 - 18)  SpO2: 96% (12 Dec 2017 08:30) (96% - 98%)  ICU Vital Signs Last 24 Hrs  TESSIE RAND  I&O's Detail    11 Dec 2017 07:01  -  12 Dec 2017 07:00  --------------------------------------------------------  IN:    Oral Fluid: 480 mL  Total IN: 480 mL    OUT:  Total OUT: 0 mL    Total NET: 480 mL        I&O's Summary    11 Dec 2017 07:01  -  12 Dec 2017 07:00  --------------------------------------------------------  IN: 480 mL / OUT: 0 mL / NET: 480 mL      Drug Dosing Weight  TESSIE GIORGI      PHYSICAL EXAM:  Constitutional: Appears well developed, well nourished; alert and co-operative  HEENT:     Head: Normocephalic and atraumatic  Eyes: Conjunctiva normal, No scleral icterus  Mouth/Throat: Mucous membranes are normal. Mucosa are not pale or dry.  Cardiovascular: Normal S1 S2, No murmurs  Respiratory: Lungs clear to auscultation; no crepitations, no wheeze  Gastrointestinal:  Soft, Non-tender, + BS	  Musculoskeletal: Normal range of motion. No edema  Skin: Warm and dry. No cyanosis . No diaphoresis.  Neurologic: Alert oriented to time place and person. Normal strength and no tremor.  Psychiatric: Normal mood and affect, Speech is normal and behavior is normal.    LABS:    RADIOLOGY & ADDITIONAL STUDIES:    INTERPRETATION OF TELEMETRY (personally reviewed): no significant arrhythmia     ECG: < from: 12 Lead ECG (11.22.17 @ 09:32) >  Normal sinus rhythm  Nonspecific T wave abnormality  Abnormal ECG    LHC: VENTRICLES: LVEF 55%  CORONARY VESSELS: The coronary circulation is right dominant.  LM:   --  LM: Normal.  LAD:   --  Proximal LAD: There was a 20 % stenosis.  --  D1: There was a 25 % stenosis.  --  D2: There was a 90 % stenosis.  CX:   --  Mid circumflex: There was a 95 % stenosis.  --  OM1: There was a 95 % stenosis.  RCA:   --  RCA: Angiography showedminor luminal irregularities with no flow limiting lesions.  --  RPDA: There was a 25 % stenosis.  COMPLICATIONS: There were no complications. No complications occurred during the cath lab visit.  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  The LCX was treated with PTCA and STENT (YUE-Syngergy) with IVUS.  DIAGNOSTIC RECOMMENDATIONS: ASA and Brilinta If persistent symptom then PCI of Dia2  INTERVENTIONAL IMPRESSIONS: Severe mid LCX disease and dia2 disease  (small). The LCX was treated with PTCA and STENT (YUE-Syngergy) with IVUS.  INTERVENTIONAL RECOMMENDATIONS: ASA and Brilinta If persistent symptom then PCI of Dia2    ASSESSMENT AND PLAN:  In summary, TESSIE RAND is an 52y Female with past medical history significant for HLD, DM, past tobacco use, CAD sp recent YUE to LCx with residual 90% stenosis of D2, recent admission for ischemic colitis presented with shortness of breath. CT with no pulmonary embolism.     - Tolerating Toprol well without any side effects.   - Continue ASA, Plavix and statin  - Reinforced compliance with DAPT  -TTE with normal biventricular function and no regional wall motion abnormality  -Patient can follow-up as outpatient for further risk stratification, and is currently medically optimized  - recommend outpatient PFT for further evaluation of SOB, doubt related to residual disease is small D2 branch   - would avoid NSAID as patient is on DAPT; if there is recurrent pleuritic pain, consider colchicine, which I discussed with the patient, however, she declined 2/2 possible GI upset      Thank you for allowing [facility] to participate in the care of this patient.  Please feel free to call with any questions or concerns. [facility], [facility]                                   [address] [place] [address]                                                      PHONE: [phone]                                                         FAX: [phone]  [email or web address]  ---------------------------------------------------------------------------------------------------------------------------------    Overnight events/patient complaints: Patient is very anxious, reviewed results of her echocardiogram and all questions answered. She denies chest pain at rest and with ambulation     Patient is s/p TTE with normal biventricular function and no regional wall motion abnormality    < from: TTE Echo Complete w/Doppler (12.11.17 @ 14:03) >   1. Normal biventricular systolic function. Left ventricular ejection fraction, by visual estimation, is 60 to 65%.   2. No significant valvular abnormalities.   3. Elevated central venous pressure.   4. Trivial pericardial effusion.   5. ** Compared to TTE dated 11/17/2017, central venous pressures appear elevated.    MEDICATIONS  (STANDING):  aspirin  chewable 81 milliGRAM(s) Oral daily  atorvastatin 20 milliGRAM(s) Oral at bedtime  clopidogrel Tablet 75 milliGRAM(s) Oral daily  cyanocobalamin 1000 MICROGram(s) Oral daily  dextrose 5%. 1000 milliLiter(s) (50 mL/Hr) IV Continuous <Continuous>  dextrose 50% Injectable 12.5 Gram(s) IV Push once  dextrose 50% Injectable 25 Gram(s) IV Push once  dextrose 50% Injectable 25 Gram(s) IV Push once  enoxaparin Injectable 40 milliGRAM(s) SubCutaneous daily  influenza   Vaccine 0.5 milliLiter(s) IntraMuscular once  insulin lispro (HumaLOG) corrective regimen sliding scale   SubCutaneous three times a day before meals  metoprolol succinate ER 25 milliGRAM(s) Oral daily    MEDICATIONS  (PRN):  acetaminophen   Tablet 650 milliGRAM(s) Oral every 6 hours PRN Pain  dextrose Gel 1 Dose(s) Oral once PRN Blood Glucose LESS THAN 70 milliGRAM(s)/deciliter  glucagon  Injectable 1 milliGRAM(s) IntraMuscular once PRN Glucose LESS THAN 70 milligrams/deciliter      Vital Signs Last 24 Hrs  T(C): 36.7 (12 Dec 2017 08:30), Max: 37 (12 Dec 2017 00:21)  T(F): 98.1 (12 Dec 2017 08:30), Max: 98.6 (12 Dec 2017 00:21)  HR: 88 (12 Dec 2017 08:30) (88 - 93)  BP: 107/73 (12 Dec 2017 08:30) (107/73 - 110/69)  BP(mean): --  RR: 18 (12 Dec 2017 08:30) (18 - 18)  SpO2: 96% (12 Dec 2017 08:30) (96% - 98%)  ICU Vital Signs Last 24 Hrs  TESSIE RAND  I&O's Detail    11 Dec 2017 07:01  -  12 Dec 2017 07:00  --------------------------------------------------------  IN:    Oral Fluid: 480 mL  Total IN: 480 mL    OUT:  Total OUT: 0 mL    Total NET: 480 mL        I&O's Summary    11 Dec 2017 07:01  -  12 Dec 2017 07:00  --------------------------------------------------------  IN: 480 mL / OUT: 0 mL / NET: 480 mL      Drug Dosing Weight  TESSIE GIORGI      PHYSICAL EXAM:  Constitutional: Appears well developed, well nourished; alert and co-operative  HEENT:     Head: Normocephalic and atraumatic  Eyes: Conjunctiva normal, No scleral icterus  Mouth/Throat: Mucous membranes are normal. Mucosa are not pale or dry.  Cardiovascular: Normal S1 S2, No murmurs  Respiratory: Lungs clear to auscultation; no crepitations, no wheeze  Gastrointestinal:  Soft, Non-tender, + BS	  Musculoskeletal: Normal range of motion. No edema  Skin: Warm and dry. No cyanosis . No diaphoresis.  Neurologic: Alert oriented to time place and person. Normal strength and no tremor.  Psychiatric: Normal mood and affect, Speech is normal and behavior is normal.    LABS:    RADIOLOGY & ADDITIONAL STUDIES:    INTERPRETATION OF TELEMETRY (personally reviewed): no significant arrhythmia     ECG: < from: 12 Lead ECG (11.22.17 @ 09:32) >  Normal sinus rhythm  Nonspecific T wave abnormality  Abnormal ECG    LHC: VENTRICLES: LVEF 55%  CORONARY VESSELS: The coronary circulation is right dominant.  LM:   --  LM: Normal.  LAD:   --  Proximal LAD: There was a 20 % stenosis.  --  D1: There was a 25 % stenosis.  --  D2: There was a 90 % stenosis.  CX:   --  Mid circumflex: There was a 95 % stenosis.  --  OM1: There was a 95 % stenosis.  RCA:   --  RCA: Angiography showedminor luminal irregularities with no flow limiting lesions.  --  RPDA: There was a 25 % stenosis.  COMPLICATIONS: There were no complications. No complications occurred during the cath lab visit.  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  The LCX was treated with PTCA and STENT (YUE-Syngergy) with IVUS.  DIAGNOSTIC RECOMMENDATIONS: ASA and Brilinta If persistent symptom then PCI of Dia2  INTERVENTIONAL IMPRESSIONS: Severe mid LCX disease and dia2 disease  (small). The LCX was treated with PTCA and STENT (YUE-Syngergy) with IVUS.  INTERVENTIONAL RECOMMENDATIONS: ASA and Brilinta If persistent symptom then PCI of Dia2    ASSESSMENT AND PLAN:  In summary, TESSIE RAND is an 52y Female with past medical history significant for HLD, DM, past tobacco use, CAD sp recent YUE to LCx with residual 90% stenosis of D2, recent admission for ischemic colitis presented with shortness of breath. CT with no pulmonary embolism.     - Tolerating Toprol well without any side effects.   - Continue ASA, Plavix and statin  - Reinforced compliance with DAPT  - TTE with normal biventricular function and no regional wall motion abnormality  - Patient can follow-up as outpatient for further risk stratification, and is currently medically optimized  - recommend outpatient PFT for further evaluation of SOB, doubt related to residual disease is small D2 branch   - would avoid NSAID as patient is on DAPT; if there is recurrent pleuritic pain, consider colchicine, which I discussed with the patient, however, she declined 2/2 possible GI upset      Thank you for allowing [facility] to participate in the care of this patient.  Please feel free to call with any questions or concerns.

## 2017-12-12 NOTE — DISCHARGE NOTE ADULT - PATIENT PORTAL LINK FT
“You can access the Follow[employer] Patient Portal, offered by [facility], by registering with the following website: [email or web address]”

## 2017-12-12 NOTE — DISCHARGE NOTE ADULT - HOSPITAL COURSE
51 yo female with recent coronary stents, still having chest pain  admitted, trops neg, ekg normal, CT chest no PE  echo showed pericardial effusion (trivial)  cleared by cardio for home

## 2017-12-12 NOTE — DISCHARGE NOTE ADULT - MEDICATION SUMMARY - MEDICATIONS TO TAKE
I will START or STAY ON the medications listed below when I get home from the hospital:    aspirin 81 mg oral tablet, chewable  -- 1 tab(s) by mouth once a day  -- Indication: For heart    lisinopril 2.5 mg oral tablet  -- 1 tab(s) by mouth once a day  -- Indication: For hypertension    ondansetron 4 mg oral tablet  -- 1 tab(s) by mouth every 8 hours MDD:PRN for nausea/vomitting  -- Indication: For nausea    atorvastatin 20 mg oral tablet  -- 1 tab(s) by mouth once a day (at bedtime)  -- Indication: For Cholesterol    clopidogrel 75 mg oral tablet  -- 1 tab(s) by mouth once a day  -- Indication: For plavix    metoprolol succinate 25 mg oral tablet, extended release  -- 1 tab(s) by mouth once a day   -- It is very important that you take or use this exactly as directed.  Do not skip doses or discontinue unless directed by your doctor.  May cause drowsiness.  Alcohol may intensify this effect.  Use care when operating dangerous machinery.  Some non-prescription drugs may aggravate your condition.  Read all labels carefully.  If a warning appears, check with your doctor before taking.  Swallow whole.  Do not crush.  Take with food or milk.  This drug may impair the ability to drive or operate machinery.  Use care until you become familiar with its effects.    -- Indication: For heart    saccharomyces boulardii lyo 250 mg oral capsule  -- 1 cap(s) by mouth 2 times a day  -- Indication: For probiotic    cyanocobalamin 1000 mcg oral tablet  -- 1 tab(s) by mouth once a day  -- Indication: For vitamin b12

## 2017-12-12 NOTE — DISCHARGE NOTE ADULT - CARE PLAN
Principal Discharge DX:	Chest pain, unspecified type  Goal:	home  Instructions for follow-up, activity and diet:	low sugar diet  Secondary Diagnosis:	CAD (coronary artery disease)  Secondary Diagnosis:	DM (diabetes mellitus)

## 2018-01-19 ENCOUNTER — APPOINTMENT (OUTPATIENT)
Dept: PULMONOLOGY | Facility: CLINIC | Age: 53
End: 2018-01-19
Payer: COMMERCIAL

## 2018-01-19 ENCOUNTER — RECORD ABSTRACTING (OUTPATIENT)
Age: 53
End: 2018-01-19

## 2018-01-19 VITALS
HEART RATE: 96 BPM | DIASTOLIC BLOOD PRESSURE: 66 MMHG | BODY MASS INDEX: 27.15 KG/M2 | SYSTOLIC BLOOD PRESSURE: 122 MMHG | OXYGEN SATURATION: 98 % | WEIGHT: 161 LBS | HEIGHT: 64.5 IN

## 2018-01-19 DIAGNOSIS — Z87.19 PERSONAL HISTORY OF OTHER DISEASES OF THE DIGESTIVE SYSTEM: ICD-10-CM

## 2018-01-19 DIAGNOSIS — Z87.42 PERSONAL HISTORY OF OTHER DISEASES OF THE FEMALE GENITAL TRACT: ICD-10-CM

## 2018-01-19 DIAGNOSIS — E78.5 HYPERLIPIDEMIA, UNSPECIFIED: ICD-10-CM

## 2018-01-19 DIAGNOSIS — R07.89 OTHER CHEST PAIN: ICD-10-CM

## 2018-01-19 DIAGNOSIS — R06.09 OTHER FORMS OF DYSPNEA: ICD-10-CM

## 2018-01-19 DIAGNOSIS — Z82.49 FAMILY HISTORY OF ISCHEMIC HEART DISEASE AND OTHER DISEASES OF THE CIRCULATORY SYSTEM: ICD-10-CM

## 2018-01-19 DIAGNOSIS — K52.9 NONINFECTIVE GASTROENTERITIS AND COLITIS, UNSPECIFIED: ICD-10-CM

## 2018-01-19 DIAGNOSIS — Z87.891 PERSONAL HISTORY OF NICOTINE DEPENDENCE: ICD-10-CM

## 2018-01-19 DIAGNOSIS — E11.9 TYPE 2 DIABETES MELLITUS W/OUT COMPLICATIONS: ICD-10-CM

## 2018-01-19 DIAGNOSIS — D25.9 LEIOMYOMA OF UTERUS, UNSPECIFIED: ICD-10-CM

## 2018-01-19 DIAGNOSIS — I25.10 ATHEROSCLEROTIC HEART DISEASE OF NATIVE CORONARY ARTERY W/OUT ANGINA PECTORIS: ICD-10-CM

## 2018-01-19 DIAGNOSIS — Z87.898 PERSONAL HISTORY OF OTHER SPECIFIED CONDITIONS: ICD-10-CM

## 2018-01-19 PROCEDURE — 94010 BREATHING CAPACITY TEST: CPT

## 2018-01-19 PROCEDURE — 99215 OFFICE O/P EST HI 40 MIN: CPT | Mod: 25

## 2018-01-19 RX ORDER — CLOTRIMAZOLE AND BETAMETHASONE DIPROPIONATE 10; .5 MG/G; MG/G
1-0.05 CREAM TOPICAL
Qty: 45 | Refills: 0 | Status: DISCONTINUED | COMMUNITY
Start: 2017-12-02 | End: 2018-01-19

## 2018-01-19 RX ORDER — ASPIRIN 81 MG
81 TABLET, DELAYED RELEASE (ENTERIC COATED) ORAL
Refills: 0 | Status: ACTIVE | COMMUNITY

## 2018-01-19 RX ORDER — CIPROFLOXACIN HYDROCHLORIDE 500 MG/1
500 TABLET, FILM COATED ORAL
Qty: 20 | Refills: 0 | Status: DISCONTINUED | COMMUNITY
Start: 2017-11-23 | End: 2018-01-19

## 2018-01-19 RX ORDER — LISINOPRIL 2.5 MG/1
2.5 TABLET ORAL
Qty: 90 | Refills: 0 | Status: DISCONTINUED | COMMUNITY
Start: 2017-11-17 | End: 2018-01-19

## 2018-01-19 RX ORDER — ONDANSETRON 4 MG/1
4 TABLET ORAL
Qty: 42 | Refills: 0 | Status: DISCONTINUED | COMMUNITY
Start: 2017-11-23 | End: 2018-01-19

## 2018-01-19 RX ORDER — METOPROLOL SUCCINATE 25 MG/1
25 TABLET, EXTENDED RELEASE ORAL
Qty: 30 | Refills: 0 | Status: ACTIVE | COMMUNITY
Start: 2017-11-18

## 2018-01-19 RX ORDER — ATORVASTATIN CALCIUM 20 MG/1
20 TABLET, FILM COATED ORAL
Qty: 90 | Refills: 0 | Status: ACTIVE | COMMUNITY
Start: 2017-11-17

## 2018-01-19 RX ORDER — MUPIROCIN 20 MG/G
2 OINTMENT TOPICAL
Qty: 22 | Refills: 0 | Status: DISCONTINUED | COMMUNITY
Start: 2017-10-09 | End: 2018-01-19

## 2018-01-19 RX ORDER — CLOPIDOGREL BISULFATE 75 MG/1
75 TABLET, FILM COATED ORAL
Qty: 90 | Refills: 0 | Status: ACTIVE | COMMUNITY
Start: 2017-11-17

## 2018-02-15 ENCOUNTER — APPOINTMENT (OUTPATIENT)
Dept: PULMONOLOGY | Facility: CLINIC | Age: 53
End: 2018-02-15

## 2018-07-16 PROBLEM — R73.9 HYPERGLYCEMIA, UNSPECIFIED: Chronic | Status: INACTIVE | Noted: 2017-11-16 | Resolved: 2017-11-19

## 2018-07-16 PROBLEM — R94.31 ABNORMAL ELECTROCARDIOGRAM [ECG] [EKG]: Chronic | Status: INACTIVE | Noted: 2017-11-16 | Resolved: 2017-11-19

## 2018-07-16 PROBLEM — R07.89 OTHER CHEST PAIN: Chronic | Status: INACTIVE | Noted: 2017-11-16 | Resolved: 2017-11-19

## 2018-07-16 PROBLEM — R06.02 SHORTNESS OF BREATH: Chronic | Status: INACTIVE | Noted: 2017-11-16 | Resolved: 2017-11-19

## 2018-07-16 PROBLEM — R73.09 OTHER ABNORMAL GLUCOSE: Chronic | Status: INACTIVE | Noted: 2017-11-16 | Resolved: 2017-11-19

## 2018-07-20 ENCOUNTER — APPOINTMENT (OUTPATIENT)
Dept: PULMONOLOGY | Facility: CLINIC | Age: 53
End: 2018-07-20

## 2018-12-28 PROBLEM — D25.9 LEIOMYOMA OF UTERUS, UNSPECIFIED: Chronic | Status: ACTIVE | Noted: 2017-11-19

## 2018-12-28 PROBLEM — E11.9 TYPE 2 DIABETES MELLITUS WITHOUT COMPLICATIONS: Chronic | Status: ACTIVE | Noted: 2017-11-16

## 2018-12-28 PROBLEM — Z00.00 ENCOUNTER FOR PREVENTIVE HEALTH EXAMINATION: Noted: 2018-12-28

## 2018-12-28 PROBLEM — Z87.891 PERSONAL HISTORY OF NICOTINE DEPENDENCE: Chronic | Status: ACTIVE | Noted: 2017-11-16

## 2018-12-28 PROBLEM — E78.5 HYPERLIPIDEMIA, UNSPECIFIED: Chronic | Status: ACTIVE | Noted: 2017-11-19

## 2018-12-28 PROBLEM — I25.10 ATHEROSCLEROTIC HEART DISEASE OF NATIVE CORONARY ARTERY WITHOUT ANGINA PECTORIS: Chronic | Status: ACTIVE | Noted: 2017-11-19

## 2019-02-20 ENCOUNTER — APPOINTMENT (OUTPATIENT)
Dept: GASTROENTEROLOGY | Facility: CLINIC | Age: 54
End: 2019-02-20

## 2019-02-27 ENCOUNTER — APPOINTMENT (OUTPATIENT)
Dept: GASTROENTEROLOGY | Facility: CLINIC | Age: 54
End: 2019-02-27
Payer: COMMERCIAL

## 2019-02-27 VITALS
RESPIRATION RATE: 16 BRPM | SYSTOLIC BLOOD PRESSURE: 140 MMHG | BODY MASS INDEX: 28.68 KG/M2 | HEIGHT: 64 IN | DIASTOLIC BLOOD PRESSURE: 68 MMHG | WEIGHT: 168 LBS | HEART RATE: 93 BPM | OXYGEN SATURATION: 98 %

## 2019-02-27 DIAGNOSIS — R11.0 NAUSEA: ICD-10-CM

## 2019-02-27 DIAGNOSIS — Z86.018 PERSONAL HISTORY OF OTHER BENIGN NEOPLASM: ICD-10-CM

## 2019-02-27 PROCEDURE — 99202 OFFICE O/P NEW SF 15 MIN: CPT

## 2019-02-27 PROCEDURE — 82272 OCCULT BLD FECES 1-3 TESTS: CPT

## 2019-02-27 RX ORDER — ONDANSETRON 4 MG/1
4 TABLET, ORALLY DISINTEGRATING ORAL 4 TIMES DAILY
Qty: 120 | Refills: 2 | Status: ACTIVE | COMMUNITY
Start: 2019-02-27 | End: 1900-01-01

## 2019-02-27 NOTE — ASSESSMENT
[FreeTextEntry1] : Impression: Colon cancer screening rule out colonic neoplasm. Patient has had no previous colonoscopies and now her Plavix can be held prior to colonoscopy.\par \par Recommendations: Screening colonoscopy was advised for further evaluation of the above. The risks versus benefits of screening colonoscopy, the one in 1000 risk of possible colonic perforation, the risk of possible post biopsy or polypectomy bleeding, the risk of possible respiratory suppression with intravenous sedation, and alternative testing such as virtual colonoscopy, were individually explained to the patient today who appeared to understand all of the above and was agreeable to proceeding with screening colonoscopy. Preprocedure cardiac clearance was requested and she was instructed to hold her Plavix for 5 days prior to the above colonoscopy. Her ASA classification is 3 and pending cardiac clearance will be medically optimized for the proposed colonoscopy and will be prepped with MiraLax and Dulcolax tablets. She appeared to understand all of the above instructions and management plan.

## 2019-02-27 NOTE — HISTORY OF PRESENT ILLNESS
[None] : had no significant interval events [de-identified] : this is the patient's first visit here [de-identified] : Patient presents today for initial evaluation for screening colonoscopy having had no previous colonoscopies. She was hospitalized over a year ago for coronary artery disease with cardiac stent placement followed by an episode of transient ischemic colitis which spontaneously resolved. She is presently on aspirin and Plavix which he has been on now for over a year and recently saw her cardiologist who informed her that she could hold her Plavix now for 5 days prior to colonoscopy. She presently has no complaints of hematochezia or melena or change in bowel habits or abdominal pain or unexplained weight loss or anorexia. In addition she is not anemic and has no first-degree relatives with either colon cancer or colon polyps.

## 2019-02-27 NOTE — PHYSICAL EXAM
[General Appearance - Alert] : alert [General Appearance - In No Acute Distress] : in no acute distress [General Appearance - Well Nourished] : well nourished [General Appearance - Well Developed] : well developed [General Appearance - Well-Appearing] : healthy appearing [Sclera] : the sclera and conjunctiva were normal [PERRL With Normal Accommodation] : pupils were equal in size, round, and reactive to light [Extraocular Movements] : extraocular movements were intact [Outer Ear] : the ears and nose were normal in appearance [Oropharynx] : the oropharynx was normal [Neck Appearance] : the appearance of the neck was normal [Neck Cervical Mass (___cm)] : no neck mass was observed [Jugular Venous Distention Increased] : there was no jugular-venous distention [Thyroid Diffuse Enlargement] : the thyroid was not enlarged [Thyroid Nodule] : there were no palpable thyroid nodules [Auscultation Breath Sounds / Voice Sounds] : lungs were clear to auscultation bilaterally [Heart Rate And Rhythm] : heart rate was normal and rhythm regular [Heart Sounds] : normal S1 and S2 [Heart Sounds Gallop] : no gallops [Murmurs] : no murmurs [Heart Sounds Pericardial Friction Rub] : no pericardial rub [Bowel Sounds] : normal bowel sounds [Abdomen Soft] : soft [Abdomen Tenderness] : non-tender [] : no hepato-splenomegaly [Abdomen Mass (___ Cm)] : no abdominal mass palpated [Normal Sphincter Tone] : normal sphincter tone [No Rectal Mass] : no rectal mass [No CVA Tenderness] : no ~M costovertebral angle tenderness [Abnormal Walk] : normal gait [Musculoskeletal - Swelling] : no joint swelling seen [Skin Color & Pigmentation] : normal skin color and pigmentation [Skin Turgor] : normal skin turgor [Oriented To Time, Place, And Person] : oriented to person, place, and time [Internal Hemorrhoid] : no internal hemorrhoids [External Hemorrhoid] : no external hemorrhoids [Occult Blood Positive] : stool was negative for occult blood [FreeTextEntry1] : Hemoccult-negative stool. The exam was chaperoned.

## 2019-05-21 ENCOUNTER — OUTPATIENT (OUTPATIENT)
Dept: OUTPATIENT SERVICES | Facility: HOSPITAL | Age: 54
LOS: 1 days | End: 2019-05-21
Payer: COMMERCIAL

## 2019-05-21 ENCOUNTER — APPOINTMENT (OUTPATIENT)
Dept: GASTROENTEROLOGY | Facility: GI CENTER | Age: 54
End: 2019-05-21
Payer: COMMERCIAL

## 2019-05-21 DIAGNOSIS — Z90.89 ACQUIRED ABSENCE OF OTHER ORGANS: Chronic | ICD-10-CM

## 2019-05-21 DIAGNOSIS — Z98.82 BREAST IMPLANT STATUS: Chronic | ICD-10-CM

## 2019-05-21 DIAGNOSIS — K64.8 OTHER HEMORRHOIDS: ICD-10-CM

## 2019-05-21 DIAGNOSIS — Z95.5 PRESENCE OF CORONARY ANGIOPLASTY IMPLANT AND GRAFT: Chronic | ICD-10-CM

## 2019-05-21 DIAGNOSIS — Z12.11 ENCOUNTER FOR SCREENING FOR MALIGNANT NEOPLASM OF COLON: ICD-10-CM

## 2019-05-21 LAB — GLUCOSE BLDC GLUCOMTR-MCNC: 170 MG/DL — HIGH (ref 70–99)

## 2019-05-21 PROCEDURE — G0121: CPT

## 2019-05-21 PROCEDURE — 82962 GLUCOSE BLOOD TEST: CPT

## 2019-05-21 PROCEDURE — 45378 DIAGNOSTIC COLONOSCOPY: CPT

## 2019-05-21 RX ORDER — HYDROCORTISONE ACETATE 25 MG/1
25 SUPPOSITORY RECTAL
Qty: 12 | Refills: 3 | Status: ACTIVE | COMMUNITY
Start: 2019-05-21 | End: 1900-01-01

## 2019-05-21 NOTE — PROCEDURE
[Colon Cancer Screening] : colon cancer screening [Procedure Explained] : The procedure was explained [Allergies Reviewed] : allergies reviewed. [Risks] : Risks [Benefits] : benefits [Alternatives] : alternatives [Bleeding] : bleeding risk [Infection] : risk of infection [Consent Obtained] : written consent was obtained prior to the procedure and is detailed in the patient's record [Patient] : the patient [Bowel Prep Kit] : the patient took the appropriate bowel preparation kit as directed [Approved Diet Followed] : the patient avoided solid foods and adhered to the approved diet list for 24 hours prior to the procedure [Automated Blood Pressure Cuff] : automated blood pressure cuff [Cardiac Monitor] : cardiac monitor [Pulse Oximeter] : pulse oximeter [Propofol ___ mg IV] : Propofol [unfilled] ~Umg intravenously [___ L/min Oxygen via NC] : [unfilled] ~Uliters/minute oxygen via nasal cannula [2] : 2 [Sedation Clearance] : the patient was cleared for moderate sedation [Time started: ___] : Start Time:  [unfilled] [Prep Qualtiy: ___] : Prep Quality:  [unfilled] [Withdrawal Time: ___] : Withdrawal Time:  [unfilled] [Time Completed: ___] : Completion Time:  [unfilled] [Performed By: ___] : Performed by:  BRANDY [Left Lateral Decubitus] : The patient was positioned in the left lateral decubitus position [Cecum (Landmarks/Transillum)] : and guided to the cecum which was identified by the anatomic landmarks of the appendiceal orifice and ileocecal valve and by transillumination in the right lower quadrant [No Difficulty] : without difficulty [Insufflated] : insufflated [Single Pass Needed] : after a single pass [Retroflex View] : a retroflex view of the rectum was performed [Normal] : Normal [Hemorrhoids] : hemorrhoids [Tolerated Well] : the patient tolerated the procedure well [Vital Signs Stable] : the vital signs were stable [No Complications] : There were no complications [Abnormal Rectum] : a normal rectum [External Hemorrhoids] : no external hemorrhoids [Patient Rotated Into Alternating Positions] : the patient was not rotated [de-identified] : [id number] [de-identified] : Large thrombosed internal hemorrhoids noted on retroflexion. [de-identified] : Large thrombosed internal hemorrhoids o/w normal colonoscopy to the cecum.

## 2019-05-21 NOTE — PROCEDURE
[Colon Cancer Screening] : colon cancer screening [Procedure Explained] : The procedure was explained [Allergies Reviewed] : allergies reviewed. [Risks] : Risks [Benefits] : benefits [Alternatives] : alternatives [Bleeding] : bleeding risk [Infection] : risk of infection [Consent Obtained] : written consent was obtained prior to the procedure and is detailed in the patient's record [Patient] : the patient [Bowel Prep Kit] : the patient took the appropriate bowel preparation kit as directed [Approved Diet Followed] : the patient avoided solid foods and adhered to the approved diet list for 24 hours prior to the procedure [Automated Blood Pressure Cuff] : automated blood pressure cuff [Cardiac Monitor] : cardiac monitor [Pulse Oximeter] : pulse oximeter [Propofol ___ mg IV] : Propofol [unfilled] ~Umg intravenously [___ L/min Oxygen via NC] : [unfilled] ~Uliters/minute oxygen via nasal cannula [2] : 2 [Sedation Clearance] : the patient was cleared for moderate sedation [Time started: ___] : Start Time:  [unfilled] [Prep Qualtiy: ___] : Prep Quality:  [unfilled] [Withdrawal Time: ___] : Withdrawal Time:  [unfilled] [Time Completed: ___] : Completion Time:  [unfilled] [Performed By: ___] : Performed by:  BRANDY [Left Lateral Decubitus] : The patient was positioned in the left lateral decubitus position [Cecum (Landmarks/Transillum)] : and guided to the cecum which was identified by the anatomic landmarks of the appendiceal orifice and ileocecal valve and by transillumination in the right lower quadrant [No Difficulty] : without difficulty [Insufflated] : insufflated [Single Pass Needed] : after a single pass [Retroflex View] : a retroflex view of the rectum was performed [Normal] : Normal [Hemorrhoids] : hemorrhoids [Tolerated Well] : the patient tolerated the procedure well [Vital Signs Stable] : the vital signs were stable [No Complications] : There were no complications [Abnormal Rectum] : a normal rectum [External Hemorrhoids] : no external hemorrhoids [Patient Rotated Into Alternating Positions] : the patient was not rotated [de-identified] : [id number] [de-identified] : Large thrombosed internal hemorrhoids noted on retroflexion. [de-identified] : Large thrombosed internal hemorrhoids o/w normal colonoscopy to the cecum.

## 2019-05-21 NOTE — ASSESSMENT
[FreeTextEntry1] : Hydrocortisone 25 mg. suppositories, one UT QHS for the above hemorrhoids. Repeat screening colonoscopy in 10 years.

## 2019-05-21 NOTE — ASSESSMENT
[FreeTextEntry1] : Hydrocortisone 25 mg. suppositories, one NV QHS for the above hemorrhoids. Repeat screening colonoscopy in 10 years.

## 2019-06-24 NOTE — ED ADULT NURSE NOTE - NS ED PATIENT SAFETY CONCERN
Pharmacy called requesting refill of RANEXA 500MG . Please send to [facility] [address]     No

## 2019-08-05 NOTE — PROGRESS NOTE ADULT - PROBLEM SELECTOR PROBLEM 2
"Large Joint Aspiration/Injection: L knee  Date/Time: 8/5/2019 3:01 PM  Performed by: Kendrick Freire MD  Authorized by: Kendrick Freire MD     Consent Done?:  Yes (Verbal)  Indications:  Pain  Procedure site marked: Yes    Timeout: Prior to procedure the correct patient, procedure, and site was verified      Location:  Knee  Site:  L knee  Prep: Patient was prepped and draped in usual sterile fashion    Ultrasonic Guidance for needle placement: Yes  Images are saved and documented.  Needle size:  20 G  Approach:  Lateral  Medications:  20 mg sodium hyaluronate (EUFLEXXA) 10 mg/mL(mw 2.4 -3.6 million)  Patient tolerance:  Patient tolerated the procedure well with no immediate complications    Additional Comments: Description of ultrasound utilization for needle guidance:   Ultrasound guidance used for needle localization. Images saved and stored for documentation. The knee joint was visualized. Dynamic visualization of the 20g x 3.5" needle was continuous throughout the procedure.      "
Hyperlipidemia

## 2019-08-29 NOTE — ED PROVIDER NOTE - VASCULAR COMPROMISE
Mom here for visit and was updated by MD Christie Canales at bedside all questions answered. Continues to have  occasionally intermittent irregular rhythm noted MD Silva aware.   Morning lab reviewed this morning by MD Christie Canales, lab ordered  for 8/30 in am.  Echo done t no vascular compromise

## 2020-02-07 NOTE — ED ADULT NURSE NOTE - CAS TRG GENERAL NORM CIRC DET
Patient s/p stroke January 27th 2020 which received tPA for, history of afib currently not on home eliquis due to GI bleed. Transferred from [place] 2/6 for further care.
Pt admitted for Anemia
Strong peripheral pulses

## 2024-06-10 NOTE — DISCHARGE NOTE ADULT - PATIENT PORTAL LINK FT
What Type Of Note Output Would You Prefer (Optional)?: Standard Output How Severe Are Your Spot(S)?: mild Have Your Spot(S) Been Treated In The Past?: has not been treated Hpi Title: Evaluation of Skin Lesions “You can access the [facility] Patient Portal, offered by [facility], by registering with the following website: [email or web address]”

## 2024-07-17 NOTE — ED PROVIDER NOTE - EYES, MLM
I met with Teresa at the bedside regarding discharge planning and home  going needs. Patient states that she lives home with her son [phone] where she is independent with ADL's she does have a walker and wheelchair in the home. Patient is not medically cleared for discharge pending surgery Friday. I will continue to follow with a safe discharge plan.   Clear bilaterally, pupils equal, round and reactive to light.

## 2024-09-09 NOTE — PROGRESS NOTE ADULT - PROBLEM SELECTOR PROBLEM 1
All other review of systems negative, except as noted in HPI
Chest pain, unspecified type
Chest pain, unspecified type